# Patient Record
Sex: MALE | Race: BLACK OR AFRICAN AMERICAN | Employment: OTHER | URBAN - METROPOLITAN AREA
[De-identification: names, ages, dates, MRNs, and addresses within clinical notes are randomized per-mention and may not be internally consistent; named-entity substitution may affect disease eponyms.]

---

## 2017-02-08 ENCOUNTER — HOSPITAL ENCOUNTER (EMERGENCY)
Age: 47
Discharge: HOME | End: 2017-02-08
Attending: EMERGENCY MEDICINE
Payer: SELF-PAY

## 2017-02-08 ENCOUNTER — APPOINTMENT (OUTPATIENT)
Dept: GENERAL RADIOLOGY | Age: 47
End: 2017-02-08
Attending: EMERGENCY MEDICINE
Payer: SELF-PAY

## 2017-02-08 VITALS
SYSTOLIC BLOOD PRESSURE: 122 MMHG | TEMPERATURE: 97.9 F | RESPIRATION RATE: 16 BRPM | WEIGHT: 180 LBS | OXYGEN SATURATION: 99 % | BODY MASS INDEX: 28.93 KG/M2 | DIASTOLIC BLOOD PRESSURE: 88 MMHG | HEART RATE: 68 BPM | HEIGHT: 66 IN

## 2017-02-08 DIAGNOSIS — K29.00 OTHER ACUTE GASTRITIS WITHOUT HEMORRHAGE: Primary | ICD-10-CM

## 2017-02-08 LAB
ALBUMIN SERPL BCP-MCNC: 3.4 G/DL (ref 3.5–5)
ALBUMIN/GLOB SERPL: 0.8 {RATIO} (ref 1.2–3.5)
ALP SERPL-CCNC: 80 U/L (ref 50–136)
ALT SERPL-CCNC: 28 U/L (ref 12–65)
ANION GAP BLD CALC-SCNC: 10 MMOL/L (ref 7–16)
AST SERPL W P-5'-P-CCNC: 27 U/L (ref 15–37)
BACTERIA URNS QL MICRO: 0 /HPF
BASOPHILS # BLD AUTO: 0 K/UL (ref 0–0.2)
BASOPHILS # BLD: 0 % (ref 0–2)
BILIRUB SERPL-MCNC: 0.4 MG/DL (ref 0.2–1.1)
BUN SERPL-MCNC: 12 MG/DL (ref 6–23)
CALCIUM SERPL-MCNC: 8.5 MG/DL (ref 8.3–10.4)
CASTS URNS QL MICRO: 0 /LPF
CHLORIDE SERPL-SCNC: 103 MMOL/L (ref 98–107)
CO2 SERPL-SCNC: 26 MMOL/L (ref 21–32)
CREAT SERPL-MCNC: 0.96 MG/DL (ref 0.8–1.5)
DIFFERENTIAL METHOD BLD: ABNORMAL
EOSINOPHIL # BLD: 0.1 K/UL (ref 0–0.8)
EOSINOPHIL NFR BLD: 1 % (ref 0.5–7.8)
EPI CELLS #/AREA URNS HPF: 0 /HPF
ERYTHROCYTE [DISTWIDTH] IN BLOOD BY AUTOMATED COUNT: 13.2 % (ref 11.9–14.6)
GLOBULIN SER CALC-MCNC: 4.4 G/DL (ref 2.3–3.5)
GLUCOSE SERPL-MCNC: 90 MG/DL (ref 65–100)
HCT VFR BLD AUTO: 33.4 % (ref 41.1–50.3)
HGB BLD-MCNC: 11.2 G/DL (ref 13.6–17.2)
IMM GRANULOCYTES # BLD: 0 K/UL (ref 0–0.5)
IMM GRANULOCYTES NFR BLD AUTO: 0.1 % (ref 0–5)
LIPASE SERPL-CCNC: 121 U/L (ref 73–393)
LYMPHOCYTES # BLD AUTO: 32 % (ref 13–44)
LYMPHOCYTES # BLD: 2.3 K/UL (ref 0.5–4.6)
MCH RBC QN AUTO: 32.7 PG (ref 26.1–32.9)
MCHC RBC AUTO-ENTMCNC: 33.5 G/DL (ref 31.4–35)
MCV RBC AUTO: 97.7 FL (ref 79.6–97.8)
MONOCYTES # BLD: 0.7 K/UL (ref 0.1–1.3)
MONOCYTES NFR BLD AUTO: 9 % (ref 4–12)
NEUTS SEG # BLD: 4.1 K/UL (ref 1.7–8.2)
NEUTS SEG NFR BLD AUTO: 58 % (ref 43–78)
PLATELET # BLD AUTO: 328 K/UL (ref 150–450)
PMV BLD AUTO: 9.1 FL (ref 10.8–14.1)
POTASSIUM SERPL-SCNC: 3.6 MMOL/L (ref 3.5–5.1)
PROT SERPL-MCNC: 7.8 G/DL (ref 6.3–8.2)
RBC # BLD AUTO: 3.42 M/UL (ref 4.23–5.67)
RBC #/AREA URNS HPF: 0 /HPF
SODIUM SERPL-SCNC: 139 MMOL/L (ref 136–145)
WBC # BLD AUTO: 7.1 K/UL (ref 4.3–11.1)
WBC URNS QL MICRO: NORMAL /HPF

## 2017-02-08 PROCEDURE — 74022 RADEX COMPL AQT ABD SERIES: CPT

## 2017-02-08 PROCEDURE — 85025 COMPLETE CBC W/AUTO DIFF WBC: CPT | Performed by: EMERGENCY MEDICINE

## 2017-02-08 PROCEDURE — 74011250637 HC RX REV CODE- 250/637: Performed by: EMERGENCY MEDICINE

## 2017-02-08 PROCEDURE — 81003 URINALYSIS AUTO W/O SCOPE: CPT | Performed by: EMERGENCY MEDICINE

## 2017-02-08 PROCEDURE — 80053 COMPREHEN METABOLIC PANEL: CPT | Performed by: EMERGENCY MEDICINE

## 2017-02-08 PROCEDURE — 99284 EMERGENCY DEPT VISIT MOD MDM: CPT | Performed by: EMERGENCY MEDICINE

## 2017-02-08 PROCEDURE — 83690 ASSAY OF LIPASE: CPT | Performed by: EMERGENCY MEDICINE

## 2017-02-08 PROCEDURE — 81015 MICROSCOPIC EXAM OF URINE: CPT | Performed by: EMERGENCY MEDICINE

## 2017-02-08 PROCEDURE — 74011000250 HC RX REV CODE- 250: Performed by: EMERGENCY MEDICINE

## 2017-02-08 RX ORDER — LIDOCAINE HYDROCHLORIDE 20 MG/ML
15 SOLUTION OROPHARYNGEAL
Status: COMPLETED | OUTPATIENT
Start: 2017-02-08 | End: 2017-02-08

## 2017-02-08 RX ORDER — PANTOPRAZOLE SODIUM 40 MG/1
40 TABLET, DELAYED RELEASE ORAL DAILY
Qty: 20 TAB | Refills: 0 | Status: SHIPPED | OUTPATIENT
Start: 2017-02-08 | End: 2017-02-28

## 2017-02-08 RX ORDER — TRAMADOL HYDROCHLORIDE 50 MG/1
50 TABLET ORAL
Qty: 20 TAB | Refills: 0 | Status: SHIPPED | OUTPATIENT
Start: 2017-02-08 | End: 2017-05-11

## 2017-02-08 RX ADMIN — Medication 30 ML: at 21:27

## 2017-02-08 RX ADMIN — LIDOCAINE HYDROCHLORIDE 15 ML: 20 SOLUTION ORAL; TOPICAL at 21:27

## 2017-02-09 NOTE — ED PROVIDER NOTES
HPI Comments: Patient presents to the emergency room with a 2 week history of constant epigastric abdominal pain that is sharp in nature radiating to his back and he states he came in tonight because he could no longer \"tough it out\". He reports nausea and vomiting but no hematemesis he denies any melena or hematochezia does has a history of peptic ulcer disease for which she had a laparotomy at one time in the past.  He does admit to some alcohol use intermittently    Patient is a 55 y.o. male presenting with abdominal pain. The history is provided by the patient. Abdominal Pain    This is a recurrent problem. The current episode started more than 1 week ago. The problem occurs constantly. The problem has not changed since onset. The pain is associated with vomiting. The pain is located in the epigastric region. The quality of the pain is sharp. The pain is at a severity of 10/10. The pain is severe. Associated symptoms include nausea and vomiting. Pertinent negatives include no anorexia, no fever, no belching, no diarrhea, no flatus, no hematochezia, no melena, no constipation, no dysuria, no frequency, no hematuria, no arthralgias, no myalgias, no trauma, no chest pain, no testicular pain and no back pain. The pain is worsened by eating. The pain is relieved by nothing. Past workup includes surgery. Past workup includes no CT scan, no ultrasound. His past medical history is significant for PUD. gastrectomy       No past medical history on file. Past Surgical History:   Procedure Laterality Date    Hx orthopaedic Left      arm    Hx gi  10/3/14     gastric perforation         Family History:   Problem Relation Age of Onset    Cancer Mother      colon    Cancer Father      liver       Social History     Social History    Marital status: LIFE PARTNER     Spouse name: N/A    Number of children: N/A    Years of education: N/A     Occupational History    Not on file.      Social History Main Topics    Smoking status: Current Every Day Smoker     Packs/day: 0.50    Smokeless tobacco: Not on file    Alcohol use Yes    Drug use: Not on file    Sexual activity: Not on file     Other Topics Concern    Not on file     Social History Narrative    No narrative on file         ALLERGIES: Review of patient's allergies indicates no known allergies. Review of Systems   Constitutional: Negative for fever. Cardiovascular: Negative for chest pain. Gastrointestinal: Positive for abdominal pain, nausea and vomiting. Negative for anorexia, constipation, diarrhea, flatus, hematochezia and melena. Genitourinary: Negative for dysuria, frequency, hematuria and testicular pain. Musculoskeletal: Negative for arthralgias, back pain and myalgias. All other systems reviewed and are negative. Vitals:    02/08/17 1918   BP: (!) 129/94   Pulse: 89   Resp: 18   Temp: 97.9 °F (36.6 °C)   SpO2: 97%   Weight: 81.6 kg (180 lb)   Height: 5' 6\" (1.676 m)            Physical Exam   Constitutional: He is oriented to person, place, and time. He appears well-developed and well-nourished. No distress. HENT:   Head: Normocephalic and atraumatic. Mouth/Throat: No oropharyngeal exudate. Eyes: Conjunctivae and EOM are normal. Pupils are equal, round, and reactive to light. Neck: Normal range of motion. Neck supple. Cardiovascular: Normal rate, regular rhythm and normal heart sounds. Pulmonary/Chest: Effort normal and breath sounds normal.   Abdominal: Soft. Bowel sounds are normal. He exhibits no distension and no mass. There is tenderness. There is no rebound and no guarding. Musculoskeletal: Normal range of motion. Neurological: He is alert and oriented to person, place, and time. Skin: Skin is warm and dry. Psychiatric: He has a normal mood and affect. His behavior is normal.   Nursing note and vitals reviewed.        MDM  Number of Diagnoses or Management Options  Diagnosis management comments: Differential diagnosis includes gastritis or gastric ulcers and duodenal ulcer or pancreatitis       Amount and/or Complexity of Data Reviewed  Clinical lab tests: ordered and reviewed  Tests in the radiology section of CPT®: ordered and reviewed  Independent visualization of images, tracings, or specimens: yes    Risk of Complications, Morbidity, and/or Mortality  Presenting problems: high  Diagnostic procedures: high  Management options: moderate    Patient Progress  Patient progress: stable    ED Course       Procedures

## 2017-02-09 NOTE — ED NOTES
Patient discharged home ambulatory with partner. Patient given medication, discharge, and follow up instructions. Patient verbalized understanding.   Patient had no questions and reported feeling better

## 2017-02-09 NOTE — ED TRIAGE NOTES
PT arrived to ED c/o abdominal pain for the past 3 weeks. PT has a HX of abdominal surgery 2 years ago. PT states he has been nauseous, and constipated. PT states laying down makes his pain worse. Pt describes pain as throbbing. PT rates pain a 10 out of 10.

## 2017-02-09 NOTE — MED STUDENT NOTES
History and Physical    Subjective:     Katie Linton is a 55 y.o.  male who presents with diffuse abdominal pain for 2 weeks. Pt has a PMHx of perforated ulcer 2 yrs ago. He rates the pain 10/10. Pt reports nausea, vomiting and decreased appetite, stating that he is unable to keep anything down. He reports constipation with his last BM today where he described it as \"greenish, brown drops\". He denied blood in his stool or dysuria. Pt smokes 1/2 ppd and drinks 4 beers/day. He denies fever, or chills. No past medical history on file. Past Surgical History   Procedure Laterality Date    Hx orthopaedic Left      arm    Hx gi  10/3/14     gastric perforation     Family History   Problem Relation Age of Onset    Cancer Mother      colon    Cancer Father      liver      Social History   Substance Use Topics    Smoking status: Current Every Day Smoker     Packs/day: 0.50    Smokeless tobacco: Not on file    Alcohol use Yes       Prior to Admission medications    Medication Sig Start Date End Date Taking? Authorizing Provider   oxycodone-acetaminophen (PERCOCET) 5-325 mg per tablet Take 1-2 tablets by mouth every four (4) hours as needed for Pain. 10/7/14   Zana STACI Horton   ranitidine (ZANTAC) 150 mg tablet Take 1 tablet by mouth two (2) times a day.  10/7/14   Zana STACI Horton     No Known Allergies       Objective:           Physical Exam:   General: 53yo  male in no acute distress  HEENT: no lymphadenopathy, poor dentition   Cardio: regular rate and rhythm, no murmurs, rubs or gallops  Pulm: CTA bilaterally, no wheezes, rales or rhonchi  Abdomen: Hyperactive bowel sounds, tender to palpation epigastric, R & LUQ, no voluntary guarding  CVA tenderness noted b/l  Neuro: alert, awake and oriented        Data Review:   Recent Results (from the past 24 hour(s))   CBC WITH AUTOMATED DIFF    Collection Time: 02/08/17  7:15 PM   Result Value Ref Range    WBC 7.1 4.3 - 11.1 K/uL    RBC 3.42 (L) 4.23 - 5.67 M/uL    HGB 11.2 (L) 13.6 - 17.2 g/dL    HCT 33.4 (L) 41.1 - 50.3 %    MCV 97.7 79.6 - 97.8 FL    MCH 32.7 26.1 - 32.9 PG    MCHC 33.5 31.4 - 35.0 g/dL    RDW 13.2 11.9 - 14.6 %    PLATELET 032 638 - 858 K/uL    MPV 9.1 (L) 10.8 - 14.1 FL    DF AUTOMATED      NEUTROPHILS 58 43 - 78 %    LYMPHOCYTES 32 13 - 44 %    MONOCYTES 9 4.0 - 12.0 %    EOSINOPHILS 1 0.5 - 7.8 %    BASOPHILS 0 0.0 - 2.0 %    IMMATURE GRANULOCYTES 0.1 0.0 - 5.0 %    ABS. NEUTROPHILS 4.1 1.7 - 8.2 K/UL    ABS. LYMPHOCYTES 2.3 0.5 - 4.6 K/UL    ABS. MONOCYTES 0.7 0.1 - 1.3 K/UL    ABS. EOSINOPHILS 0.1 0.0 - 0.8 K/UL    ABS. BASOPHILS 0.0 0.0 - 0.2 K/UL    ABS. IMM. GRANS. 0.0 0.0 - 0.5 K/UL   METABOLIC PANEL, COMPREHENSIVE    Collection Time: 02/08/17  7:15 PM   Result Value Ref Range    Sodium 139 136 - 145 mmol/L    Potassium 3.6 3.5 - 5.1 mmol/L    Chloride 103 98 - 107 mmol/L    CO2 26 21 - 32 mmol/L    Anion gap 10 7 - 16 mmol/L    Glucose 90 65 - 100 mg/dL    BUN 12 6 - 23 MG/DL    Creatinine 0.96 0.8 - 1.5 MG/DL    GFR est AA >60 >60 ml/min/1.73m2    GFR est non-AA >60 >60 ml/min/1.73m2    Calcium 8.5 8.3 - 10.4 MG/DL    Bilirubin, total 0.4 0.2 - 1.1 MG/DL    ALT (SGPT) 28 12 - 65 U/L    AST (SGOT) 27 15 - 37 U/L    Alk.  phosphatase 80 50 - 136 U/L    Protein, total 7.8 6.3 - 8.2 g/dL    Albumin 3.4 (L) 3.5 - 5.0 g/dL    Globulin 4.4 (H) 2.3 - 3.5 g/dL    A-G Ratio 0.8 (L) 1.2 - 3.5     LIPASE    Collection Time: 02/08/17  7:15 PM   Result Value Ref Range    Lipase 121 73 - 393 U/L   URINE MICROSCOPIC    Collection Time: 02/08/17  8:34 PM   Result Value Ref Range    WBC 0-3 0 /hpf    RBC 0 0 /hpf    Epithelial cells 0 0 /hpf    Bacteria 0 0 /hpf    Casts 0 0 /lpf           Assessment:     R/o gastric perforation  Gastritis   PUD  R/o pancreatitis-Lipase WNL    Plan:     Gastritis-abdominal series XR to r/o gastric perforation was negative  Advised pt to avoid alcohol and medications like ASA and motrin   See if pain improves with GI cocktail     Signed By: Steven Moreland     February 8, 2017       *ATTENTION:  This note has been created by a medical student for educational purposes only. Please do not refer to the content of this note for clinical decision-making, billing, or other purposes. Please see attending physicians note to obtain clinical information on this patient. *

## 2017-02-09 NOTE — ED NOTES
\"He had a ulcer bust a couple years ago and they said if we had any problems to come back.   He has been hurting for 2 weeks\"

## 2017-02-09 NOTE — DISCHARGE INSTRUCTIONS
Gastritis: Care Instructions  Your Care Instructions    Gastritis is a sore and upset stomach. It happens when something irritates the stomach lining. Many things can cause it. These include an infection such as the flu or something you ate or drank. Medicines or a sore on the lining of the stomach (ulcer) also can cause it. Your belly may bloat and ache. You may belch, vomit, and feel sick to your stomach. You should be able to relieve the problem by taking medicine. And it may help to change your diet. If gastritis lasts, your doctor may prescribe medicine. Follow-up care is a key part of your treatment and safety. Be sure to make and go to all appointments, and call your doctor if you are having problems. It's also a good idea to know your test results and keep a list of the medicines you take. How can you care for yourself at home? · If your doctor prescribed antibiotics, take them as directed. Do not stop taking them just because you feel better. You need to take the full course of antibiotics. · Be safe with medicines. If your doctor prescribed medicine to decrease stomach acid, take it as directed. Call your doctor if you think you are having a problem with your medicine. · Do not take any other medicine, including over-the-counter pain relievers, without talking to your doctor first.  · If your doctor recommends over-the-counter medicine to reduce stomach acid, such as Pepcid AC, Prilosec, Tagamet HB, or Zantac 75, follow the directions on the label. · Drink plenty of fluids (enough so that your urine is light yellow or clear like water) to prevent dehydration. Choose water and other caffeine-free clear liquids. If you have kidney, heart, or liver disease and have to limit fluids, talk with your doctor before you increase the amount of fluids you drink. · Limit how much alcohol you drink. · Avoid coffee, tea, cola drinks, chocolate, and other foods with caffeine.  They increase stomach acid.  When should you call for help? Call 911 anytime you think you may need emergency care. For example, call if:  · You vomit blood or what looks like coffee grounds. · You pass maroon or very bloody stools. Call your doctor now or seek immediate medical care if:  · You start breathing fast and have not produced urine in the last 8 hours. · You cannot keep fluids down. Watch closely for changes in your health, and be sure to contact your doctor if:  · You do not get better as expected. Where can you learn more? Go to http://jenni-davidson.info/. Enter [id number] in the search box to learn more about \"Gastritis: Care Instructions. \"  Current as of: August 9, 2016  Content Version: 11.1  © 8375-4605 [employer]. Care instructions adapted under license by [employer] (which disclaims liability or warranty for this information). If you have questions about a medical condition or this instruction, always ask your healthcare professional. [address] any warranty or liability for your use of this information.

## 2017-05-11 ENCOUNTER — HOSPITAL ENCOUNTER (EMERGENCY)
Age: 47
Discharge: HOME | End: 2017-05-11
Payer: SELF-PAY

## 2017-05-11 ENCOUNTER — APPOINTMENT (OUTPATIENT)
Dept: GENERAL RADIOLOGY | Age: 47
End: 2017-05-11
Payer: SELF-PAY

## 2017-05-11 VITALS
OXYGEN SATURATION: 97 % | HEART RATE: 80 BPM | WEIGHT: 170 LBS | RESPIRATION RATE: 18 BRPM | TEMPERATURE: 98.3 F | SYSTOLIC BLOOD PRESSURE: 120 MMHG | DIASTOLIC BLOOD PRESSURE: 78 MMHG | BODY MASS INDEX: 25.18 KG/M2 | HEIGHT: 69 IN
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
## 2017-05-11 DIAGNOSIS — S39.012A LUMBAR STRAIN, INITIAL ENCOUNTER: ICD-10-CM

## 2017-05-11 DIAGNOSIS — S16.1XXA CERVICAL STRAIN, INITIAL ENCOUNTER: Primary | ICD-10-CM

## 2017-05-11 DIAGNOSIS — M47.816 OSTEOARTHRITIS OF LUMBAR SPINE, UNSPECIFIED SPINAL OSTEOARTHRITIS COMPLICATION STATUS: ICD-10-CM

## 2017-05-11 PROCEDURE — 72040 X-RAY EXAM NECK SPINE 2-3 VW: CPT

## 2017-05-11 PROCEDURE — 99285 EMERGENCY DEPT VISIT HI MDM: CPT

## 2017-05-11 PROCEDURE — 74011250637 HC RX REV CODE- 250/637

## 2017-05-11 PROCEDURE — 72100 X-RAY EXAM L-S SPINE 2/3 VWS: CPT

## 2017-05-11 RX ORDER — CYCLOBENZAPRINE HCL 10 MG
10 TABLET ORAL
Qty: 15 TAB | Refills: 0 | Status: SHIPPED | OUTPATIENT
Start: 2017-05-11 | End: 2017-05-11

## 2017-05-11 RX ORDER — IBUPROFEN 600 MG/1
600 TABLET ORAL
Qty: 20 TAB | Refills: 0 | Status: SHIPPED | OUTPATIENT
Start: 2017-05-11 | End: 2019-03-28

## 2017-05-11 RX ORDER — IBUPROFEN 600 MG/1
600 TABLET ORAL
Qty: 20 TAB | Refills: 0 | Status: SHIPPED | OUTPATIENT
Start: 2017-05-11 | End: 2017-05-11

## 2017-05-11 RX ORDER — IBUPROFEN 400 MG/1
400 TABLET ORAL
Status: COMPLETED | OUTPATIENT
Start: 2017-05-11 | End: 2017-05-11

## 2017-05-11 RX ORDER — CYCLOBENZAPRINE HCL 10 MG
10 TABLET ORAL
Qty: 15 TAB | Refills: 0 | Status: SHIPPED | OUTPATIENT
Start: 2017-05-11

## 2017-05-11 RX ORDER — CYCLOBENZAPRINE HCL 10 MG
10 TABLET ORAL
Status: COMPLETED | OUTPATIENT
Start: 2017-05-11 | End: 2017-05-11

## 2017-05-11 RX ADMIN — IBUPROFEN 400 MG: 400 TABLET, FILM COATED ORAL at 09:23

## 2017-05-11 RX ADMIN — CYCLOBENZAPRINE HYDROCHLORIDE 10 MG: 10 TABLET, FILM COATED ORAL at 09:23
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
## 2017-05-11 NOTE — LETTER
[address] [facility] EMERGENCY DEPT 
[facility] [address] [facility] [address] 
[phone] Work/School Note Date: 5/11/2017 To Whom It May concern: 
 
Karissa Dumont was seen and treated today in the emergency room by the following provider(s): 
Attending Provider: Alma Up MD.   
 
Karissa Dumont may return to work on 5/13/17. Sincerely, Marguerite Moscoso RN

## 2018-03-15 ENCOUNTER — HOSPITAL ENCOUNTER (EMERGENCY)
Age: 48
Discharge: HOME | End: 2018-03-15
Attending: EMERGENCY MEDICINE
Payer: SELF-PAY

## 2018-03-15 VITALS
SYSTOLIC BLOOD PRESSURE: 112 MMHG | RESPIRATION RATE: 16 BRPM | BODY MASS INDEX: 23.7 KG/M2 | TEMPERATURE: 98.7 F | HEIGHT: 69 IN | WEIGHT: 160 LBS | HEART RATE: 78 BPM | OXYGEN SATURATION: 99 % | DIASTOLIC BLOOD PRESSURE: 72 MMHG

## 2018-03-15 DIAGNOSIS — S41.112A LACERATION OF LEFT UPPER EXTREMITY, INITIAL ENCOUNTER: Primary | ICD-10-CM

## 2018-03-15 PROCEDURE — 77030002916 HC SUT ETHLN J&J -A

## 2018-03-15 PROCEDURE — 99283 EMERGENCY DEPT VISIT LOW MDM: CPT | Performed by: EMERGENCY MEDICINE

## 2018-03-15 PROCEDURE — 75810000293 HC SIMP/SUPERF WND  RPR: Performed by: EMERGENCY MEDICINE

## 2018-03-15 PROCEDURE — 74011250637 HC RX REV CODE- 250/637: Performed by: EMERGENCY MEDICINE

## 2018-03-15 RX ORDER — IBUPROFEN 800 MG/1
800 TABLET ORAL ONCE
Status: COMPLETED | OUTPATIENT
Start: 2018-03-15 | End: 2018-03-15

## 2018-03-15 RX ORDER — CEPHALEXIN 500 MG/1
500 CAPSULE ORAL 4 TIMES DAILY
Qty: 28 CAP | Refills: 0 | Status: SHIPPED | OUTPATIENT
Start: 2018-03-15 | End: 2018-03-22

## 2018-03-15 RX ORDER — CEPHALEXIN 500 MG/1
500 CAPSULE ORAL
Status: COMPLETED | OUTPATIENT
Start: 2018-03-15 | End: 2018-03-15

## 2018-03-15 RX ADMIN — CEPHALEXIN 500 MG: 500 CAPSULE ORAL at 21:59

## 2018-03-15 RX ADMIN — IBUPROFEN 800 MG: 800 TABLET, FILM COATED ORAL at 21:59

## 2018-03-15 NOTE — ED TRIAGE NOTES
Pt states having a laceration to the left arm that has been bleeding since yesterday. Pt has a bandage on the site and no active bleeding is noted.  Pt cut it on a piece of metal.

## 2018-03-16 NOTE — DISCHARGE INSTRUCTIONS
Cuts: Care Instructions  Your Care Instructions  A cut can happen anywhere on your body. Stitches, staples, skin adhesives, or pieces of tape called Steri-Strips are sometimes used to keep the edges of a cut together and help it heal. Steri-Strips can be used by themselves or with stitches or staples. Sometimes cuts are left open. If the cut went deep and through the skin, the doctor may have closed the cut in two layers. A deeper layer of stitches brings the deep part of the cut together. These stitches will dissolve and don't need to be removed. The upper layer closure, which could be stitches, staples, Steri-Strips, or adhesive, is what you see on the cut. A cut is often covered by a bandage. The doctor has checked you carefully, but problems can develop later. If you notice any problems or new symptoms, get medical treatment right away. Follow-up care is a key part of your treatment and safety. Be sure to make and go to all appointments, and call your doctor if you are having problems. It's also a good idea to know your test results and keep a list of the medicines you take. How can you care for yourself at home? If a cut is open or closed  · Prop up the sore area on a pillow anytime you sit or lie down during the next 3 days. Try to keep it above the level of your heart. This will help reduce swelling. · Keep the cut dry for the first 24 to 48 hours. After this, you can shower if your doctor okays it. Pat the cut dry. · Don't soak the cut, such as in a bathtub. Your doctor will tell you when it's safe to get the cut wet. · After the first 24 to 48 hours, clean the cut with soap and water 2 times a day unless your doctor gives you different instructions. ¨ Don't use hydrogen peroxide or alcohol, which can slow healing. ¨ You may cover the cut with a thin layer of petroleum jelly and a nonstick bandage.   ¨ If the doctor put a bandage over the cut, put on a new bandage after cleaning the cut or if the bandage gets wet or dirty. · Avoid any activity that could cause your cut to reopen. · Be safe with medicines. Read and follow all instructions on the label. ¨ If the doctor gave you a prescription medicine for pain, take it as prescribed. ¨ If you are not taking a prescription pain medicine, ask your doctor if you can take an over-the-counter medicine. If the cut is closed with stitches, staples, or Steri-Strips  · Follow the above instructions for open or closed cuts. · Do not remove the stitches or staples on your own. Your doctor will tell you when to come back to have the stitches or staples removed. · Leave Steri-Strips on until they fall off. If the cut is closed with a skin adhesive  · Follow the above instructions for open or closed cuts. · Leave the skin adhesive on your skin until it falls off on its own. This may take 5 to 10 days. · Do not scratch, rub, or pick at the adhesive. · Do not put the sticky part of a bandage directly on the adhesive. · Do not put any kind of ointment, cream, or lotion over the area. This can make the adhesive fall off too soon. Do not use hydrogen peroxide or alcohol, which can slow healing. When should you call for help? Call your doctor now or seek immediate medical care if:  ? · You have new pain, or your pain gets worse. ? · The skin near the cut is cold or pale or changes color. ? · You have tingling, weakness, or numbness near the cut.   ? · The cut starts to bleed, and blood soaks through the bandage. Oozing small amounts of blood is normal.   ? · You have trouble moving the area near the cut.   ? · You have symptoms of infection, such as:  ¨ Increased pain, swelling, warmth, or redness around the cut. ¨ Red streaks leading from the cut. ¨ Pus draining from the cut. ¨ A fever. ? Watch closely for changes in your health, and be sure to contact your doctor if:  ? · The cut reopens. ? · You do not get better as expected.    Where can you learn more? Go to http://jenni-davidson.info/. Enter M735 in the search box to learn more about \"Cuts: Care Instructions. \"  Current as of: March 20, 2017  Content Version: 11.4  © 4942-6134 [employer]. Care instructions adapted under license by [employer] (which disclaims liability or warranty for this information). If you have questions about a medical condition or this instruction, always ask your healthcare professional. [address] any warranty or liability for your use of this information.

## 2018-03-16 NOTE — ED NOTES
I have reviewed discharge instructions with the patient. The patient verbalized understanding. Patient left ED via Discharge Method: ambulatory to Home with  self    Opportunity for questions and clarification provided. Patient given 1 scripts. To continue your aftercare when you leave the hospital, you may receive an automated call from our care team to check in on how you are doing. This is a free service and part of our promise to provide the best care and service to meet your aftercare needs.  If you have questions, or wish to unsubscribe from this service please call [phone]. Thank you for Choosing our [facility].

## 2018-03-16 NOTE — ED PROVIDER NOTES
HPI Comments: Patient is a 59-year-old male who states he cut his left forearm on a piece of metal yesterday. He dressed it with a bandage himself. He he did not seek medical care. He states he had a tetanus shot within the past 5 years. Patient is a 52 y.o. male presenting with skin laceration. The history is provided by the patient. Laceration    The incident occurred yesterday. The laceration is located on the left arm. The laceration is 5 cm in size. The injury mechanism is other. Foreign body present: no. The pain is mild. The pain has been constant since onset. Pertinent negatives include no numbness, no tingling, no loss of motion and no discoloration. The patient's last tetanus shot was less than 5 years ago. Past Medical History:   Diagnosis Date    Chronic kidney disease        Past Surgical History:   Procedure Laterality Date    HX GI  10/3/14    gastric perforation    HX ORTHOPAEDIC Left     arm         Family History:   Problem Relation Age of Onset    Cancer Mother      colon    Cancer Father      liver       Social History     Social History    Marital status: LIFE PARTNER     Spouse name: N/A    Number of children: N/A    Years of education: N/A     Occupational History    Not on file. Social History Main Topics    Smoking status: Current Every Day Smoker     Packs/day: 0.50    Smokeless tobacco: Not on file    Alcohol use Yes    Drug use: Not on file    Sexual activity: Not on file     Other Topics Concern    Not on file     Social History Narrative         ALLERGIES: Review of patient's allergies indicates no known allergies. Review of Systems   Constitutional: Negative. HENT: Negative. Eyes: Negative. Respiratory: Negative. Cardiovascular: Negative. Endocrine: Negative. Genitourinary: Negative. Neurological: Negative for tingling and numbness.        Vitals:    03/15/18 1853   BP: 110/77   Pulse: 96   Resp: 16   Temp: 98.7 °F (37.1 °C) SpO2: 96%   Weight: 72.6 kg (160 lb)   Height: 5' 9\" (1.753 m)            Physical Exam   Constitutional: He is oriented to person, place, and time. He appears well-developed and well-nourished. Cardiovascular: Intact distal pulses. Musculoskeletal: He exhibits no tenderness or deformity. Gaping open 5 cm laceration on the left proximal forearm there is no active bleeding, no obvious foreign body. Neurovascular status distal to the wound is all completely intact. Neurological: He is alert and oriented to person, place, and time. He has normal strength. No sensory deficit. Skin: Skin is warm and dry. No erythema. Vitals reviewed. MDM  Number of Diagnoses or Management Options  Diagnosis management comments: Differential diagnosis includes laceration, tendon injury, muscle injury, foreign body    This wound is over 25 hours old however it is gaping open. Feel it would best be treated with loose closure at this time we will also place him on empiric oral antibiotics because it appeared to be a dirty wound. Risk of Complications, Morbidity, and/or Mortality  Presenting problems: low  Diagnostic procedures: minimal  Management options: moderate    Patient Progress  Patient progress: stable        ED Course       Wound Repair  Date/Time: 3/15/2018 9:30 PM  Performed by: studentSupervising provider: Dr Abbey Bender  Preparation: skin prepped with Betadine  Pre-procedure re-eval: Immediately prior to the procedure, the patient was reevaluated and found suitable for the planned procedure and any planned medications. Time out: Immediately prior to the procedure a time out was called to verify the correct patient, procedure, equipment, staff and marking as appropriate. .  Location details: left arm  Wound length:2.6 - 7.5 cm  Anesthesia: local infiltration    Anesthesia:  Local Anesthetic: lidocaine 1% without epinephrine  Anesthetic total: 5 mL  Foreign bodies: no foreign bodies  Irrigation solution: saline  Irrigation method: syringe  Debridement: none  Skin closure: 4-0 nylon  Number of sutures: 4  Technique: simple  Approximation: loose  Dressing: non-adhesive packing strip  Patient tolerance: Patient tolerated the procedure well with no immediate complications  My total time at bedside, performing this procedure was 16-30 minutes.

## 2018-07-15 ENCOUNTER — HOSPITAL ENCOUNTER (INPATIENT)
Age: 48
LOS: 9 days | Discharge: HOME | DRG: 271 | End: 2018-07-24
Attending: EMERGENCY MEDICINE | Admitting: INTERNAL MEDICINE
Payer: SELF-PAY

## 2018-07-15 ENCOUNTER — APPOINTMENT (OUTPATIENT)
Dept: CT IMAGING | Age: 48
DRG: 271 | End: 2018-07-15
Attending: EMERGENCY MEDICINE
Payer: SELF-PAY

## 2018-07-15 DIAGNOSIS — R10.9 ABDOMINAL PAIN, UNSPECIFIED ABDOMINAL LOCATION: ICD-10-CM

## 2018-07-15 DIAGNOSIS — R63.4 EXCESSIVE WEIGHT LOSS: ICD-10-CM

## 2018-07-15 DIAGNOSIS — I82.220: Primary | ICD-10-CM

## 2018-07-15 DIAGNOSIS — I82.220 IVC THROMBOSIS (HCC): ICD-10-CM

## 2018-07-15 DIAGNOSIS — D18.03 HEMANGIOMA OF LIVER: ICD-10-CM

## 2018-07-15 LAB
ALBUMIN SERPL-MCNC: 3.7 G/DL (ref 3.5–5)
ALBUMIN/GLOB SERPL: 0.9 {RATIO} (ref 1.2–3.5)
ALP SERPL-CCNC: 70 U/L (ref 50–136)
ALT SERPL-CCNC: 40 U/L (ref 12–65)
ANION GAP SERPL CALC-SCNC: 10 MMOL/L (ref 7–16)
AST SERPL-CCNC: 54 U/L (ref 15–37)
BACTERIA URNS QL MICRO: ABNORMAL /HPF
BASOPHILS # BLD: 0 K/UL (ref 0–0.2)
BASOPHILS NFR BLD: 0 % (ref 0–2)
BILIRUB SERPL-MCNC: 1.2 MG/DL (ref 0.2–1.1)
BUN SERPL-MCNC: 15 MG/DL (ref 6–23)
CALCIUM SERPL-MCNC: 8.6 MG/DL (ref 8.3–10.4)
CASTS URNS QL MICRO: ABNORMAL /LPF
CHLORIDE SERPL-SCNC: 105 MMOL/L (ref 98–107)
CO2 SERPL-SCNC: 24 MMOL/L (ref 21–32)
CREAT SERPL-MCNC: 1.16 MG/DL (ref 0.8–1.5)
DIFFERENTIAL METHOD BLD: ABNORMAL
EOSINOPHIL # BLD: 0 K/UL (ref 0–0.8)
EOSINOPHIL NFR BLD: 1 % (ref 0.5–7.8)
EPI CELLS #/AREA URNS HPF: ABNORMAL /HPF
ERYTHROCYTE [DISTWIDTH] IN BLOOD BY AUTOMATED COUNT: 13.2 % (ref 11.9–14.6)
GLOBULIN SER CALC-MCNC: 4.3 G/DL (ref 2.3–3.5)
GLUCOSE SERPL-MCNC: 109 MG/DL (ref 65–100)
HCT VFR BLD AUTO: 43.5 % (ref 41.1–50.3)
HGB BLD-MCNC: 14.7 G/DL (ref 13.6–17.2)
IMM GRANULOCYTES # BLD: 0 K/UL (ref 0–0.5)
IMM GRANULOCYTES NFR BLD AUTO: 0 % (ref 0–5)
LIPASE SERPL-CCNC: 34 U/L (ref 73–393)
LYMPHOCYTES # BLD: 1.7 K/UL (ref 0.5–4.6)
LYMPHOCYTES NFR BLD: 40 % (ref 13–44)
MCH RBC QN AUTO: 33.2 PG (ref 26.1–32.9)
MCHC RBC AUTO-ENTMCNC: 33.8 G/DL (ref 31.4–35)
MCV RBC AUTO: 98.2 FL (ref 79.6–97.8)
MONOCYTES # BLD: 0.5 K/UL (ref 0.1–1.3)
MONOCYTES NFR BLD: 13 % (ref 4–12)
NEUTS SEG # BLD: 2 K/UL (ref 1.7–8.2)
NEUTS SEG NFR BLD: 46 % (ref 43–78)
PLATELET # BLD AUTO: 282 K/UL (ref 150–450)
PMV BLD AUTO: 9.5 FL (ref 10.8–14.1)
POTASSIUM SERPL-SCNC: 5.1 MMOL/L (ref 3.5–5.1)
PROT SERPL-MCNC: 8 G/DL (ref 6.3–8.2)
RBC # BLD AUTO: 4.43 M/UL (ref 4.23–5.67)
RBC #/AREA URNS HPF: ABNORMAL /HPF
SODIUM SERPL-SCNC: 139 MMOL/L (ref 136–145)
WBC # BLD AUTO: 4.3 K/UL (ref 4.3–11.1)
WBC URNS QL MICRO: ABNORMAL /HPF

## 2018-07-15 PROCEDURE — 74011000258 HC RX REV CODE- 258: Performed by: EMERGENCY MEDICINE

## 2018-07-15 PROCEDURE — 65660000000 HC RM CCU STEPDOWN

## 2018-07-15 PROCEDURE — 96375 TX/PRO/DX INJ NEW DRUG ADDON: CPT | Performed by: EMERGENCY MEDICINE

## 2018-07-15 PROCEDURE — 74011636320 HC RX REV CODE- 636/320: Performed by: EMERGENCY MEDICINE

## 2018-07-15 PROCEDURE — 74177 CT ABD & PELVIS W/CONTRAST: CPT

## 2018-07-15 PROCEDURE — 74011250636 HC RX REV CODE- 250/636: Performed by: EMERGENCY MEDICINE

## 2018-07-15 PROCEDURE — 80053 COMPREHEN METABOLIC PANEL: CPT | Performed by: EMERGENCY MEDICINE

## 2018-07-15 PROCEDURE — 96376 TX/PRO/DX INJ SAME DRUG ADON: CPT | Performed by: EMERGENCY MEDICINE

## 2018-07-15 PROCEDURE — 85730 THROMBOPLASTIN TIME PARTIAL: CPT | Performed by: NURSE PRACTITIONER

## 2018-07-15 PROCEDURE — 96374 THER/PROPH/DIAG INJ IV PUSH: CPT | Performed by: EMERGENCY MEDICINE

## 2018-07-15 PROCEDURE — 83690 ASSAY OF LIPASE: CPT | Performed by: EMERGENCY MEDICINE

## 2018-07-15 PROCEDURE — 36415 COLL VENOUS BLD VENIPUNCTURE: CPT | Performed by: NURSE PRACTITIONER

## 2018-07-15 PROCEDURE — 85025 COMPLETE CBC W/AUTO DIFF WBC: CPT | Performed by: EMERGENCY MEDICINE

## 2018-07-15 PROCEDURE — 81015 MICROSCOPIC EXAM OF URINE: CPT | Performed by: EMERGENCY MEDICINE

## 2018-07-15 PROCEDURE — 81003 URINALYSIS AUTO W/O SCOPE: CPT | Performed by: EMERGENCY MEDICINE

## 2018-07-15 PROCEDURE — 99285 EMERGENCY DEPT VISIT HI MDM: CPT | Performed by: EMERGENCY MEDICINE

## 2018-07-15 RX ORDER — NALOXONE HYDROCHLORIDE 0.4 MG/ML
0.4 INJECTION, SOLUTION INTRAMUSCULAR; INTRAVENOUS; SUBCUTANEOUS AS NEEDED
Status: DISCONTINUED | OUTPATIENT
Start: 2018-07-15 | End: 2018-07-24 | Stop reason: HOSPADM

## 2018-07-15 RX ORDER — SODIUM CHLORIDE 9 MG/ML
100 INJECTION, SOLUTION INTRAVENOUS CONTINUOUS
Status: DISCONTINUED | OUTPATIENT
Start: 2018-07-15 | End: 2018-07-16

## 2018-07-15 RX ORDER — HEPARIN SODIUM 5000 [USP'U]/ML
5000 INJECTION, SOLUTION INTRAVENOUS; SUBCUTANEOUS
Status: COMPLETED | OUTPATIENT
Start: 2018-07-15 | End: 2018-07-15

## 2018-07-15 RX ORDER — ONDANSETRON 2 MG/ML
4 INJECTION INTRAMUSCULAR; INTRAVENOUS
Status: COMPLETED | OUTPATIENT
Start: 2018-07-15 | End: 2018-07-15

## 2018-07-15 RX ORDER — HEPARIN SODIUM 5000 [USP'U]/100ML
18-36 INJECTION, SOLUTION INTRAVENOUS
Status: DISCONTINUED | OUTPATIENT
Start: 2018-07-15 | End: 2018-07-16 | Stop reason: DRUGHIGH

## 2018-07-15 RX ORDER — LORAZEPAM 1 MG/1
1 TABLET ORAL
Status: DISCONTINUED | OUTPATIENT
Start: 2018-07-15 | End: 2018-07-24 | Stop reason: HOSPADM

## 2018-07-15 RX ORDER — HYDRALAZINE HYDROCHLORIDE 20 MG/ML
10 INJECTION INTRAMUSCULAR; INTRAVENOUS EVERY 6 HOURS
Status: DISCONTINUED | OUTPATIENT
Start: 2018-07-15 | End: 2018-07-15

## 2018-07-15 RX ORDER — ONDANSETRON 2 MG/ML
4 INJECTION INTRAMUSCULAR; INTRAVENOUS
Status: DISCONTINUED | OUTPATIENT
Start: 2018-07-15 | End: 2018-07-24 | Stop reason: HOSPADM

## 2018-07-15 RX ORDER — MORPHINE SULFATE 2 MG/ML
6 INJECTION, SOLUTION INTRAMUSCULAR; INTRAVENOUS
Status: COMPLETED | OUTPATIENT
Start: 2018-07-15 | End: 2018-07-15

## 2018-07-15 RX ORDER — MORPHINE SULFATE 2 MG/ML
2 INJECTION, SOLUTION INTRAMUSCULAR; INTRAVENOUS
Status: DISCONTINUED | OUTPATIENT
Start: 2018-07-15 | End: 2018-07-20

## 2018-07-15 RX ORDER — MORPHINE SULFATE 2 MG/ML
4 INJECTION, SOLUTION INTRAMUSCULAR; INTRAVENOUS
Status: COMPLETED | OUTPATIENT
Start: 2018-07-15 | End: 2018-07-15

## 2018-07-15 RX ORDER — SODIUM CHLORIDE 0.9 % (FLUSH) 0.9 %
5-10 SYRINGE (ML) INJECTION EVERY 8 HOURS
Status: DISCONTINUED | OUTPATIENT
Start: 2018-07-15 | End: 2018-07-24 | Stop reason: HOSPADM

## 2018-07-15 RX ORDER — SODIUM CHLORIDE 0.9 % (FLUSH) 0.9 %
5-10 SYRINGE (ML) INJECTION AS NEEDED
Status: DISCONTINUED | OUTPATIENT
Start: 2018-07-15 | End: 2018-07-24 | Stop reason: HOSPADM

## 2018-07-15 RX ORDER — ENALAPRILAT 1.25 MG/ML
1.25 INJECTION INTRAVENOUS EVERY 6 HOURS
Status: DISCONTINUED | OUTPATIENT
Start: 2018-07-15 | End: 2018-07-17

## 2018-07-15 RX ORDER — SODIUM CHLORIDE 0.9 % (FLUSH) 0.9 %
10 SYRINGE (ML) INJECTION
Status: COMPLETED | OUTPATIENT
Start: 2018-07-15 | End: 2018-07-15

## 2018-07-15 RX ORDER — IBUPROFEN 200 MG
1 TABLET ORAL DAILY
Status: DISCONTINUED | OUTPATIENT
Start: 2018-07-16 | End: 2018-07-24 | Stop reason: HOSPADM

## 2018-07-15 RX ADMIN — MORPHINE SULFATE 2 MG: 2 INJECTION, SOLUTION INTRAMUSCULAR; INTRAVENOUS at 20:28

## 2018-07-15 RX ADMIN — HEPARIN SODIUM 5000 UNITS: 5000 INJECTION, SOLUTION INTRAVENOUS; SUBCUTANEOUS at 18:12

## 2018-07-15 RX ADMIN — IOPAMIDOL 100 ML: 755 INJECTION, SOLUTION INTRAVENOUS at 16:36

## 2018-07-15 RX ADMIN — SODIUM CHLORIDE 1000 ML: 900 INJECTION, SOLUTION INTRAVENOUS at 15:22

## 2018-07-15 RX ADMIN — CEFTRIAXONE 1 G: 1 INJECTION, POWDER, FOR SOLUTION INTRAMUSCULAR; INTRAVENOUS at 20:06

## 2018-07-15 RX ADMIN — SODIUM CHLORIDE 100 ML: 900 INJECTION, SOLUTION INTRAVENOUS at 15:24

## 2018-07-15 RX ADMIN — HEPARIN SODIUM AND DEXTROSE 18 UNITS/KG/HR: 5000; 5 INJECTION INTRAVENOUS at 18:12

## 2018-07-15 RX ADMIN — MORPHINE SULFATE 6 MG: 2 INJECTION, SOLUTION INTRAMUSCULAR; INTRAVENOUS at 17:18

## 2018-07-15 RX ADMIN — HEPARIN SODIUM AND DEXTROSE 18 UNITS/KG/HR: 5000; 5 INJECTION INTRAVENOUS at 19:11

## 2018-07-15 RX ADMIN — Medication 10 ML: at 16:36

## 2018-07-15 RX ADMIN — MORPHINE SULFATE 4 MG: 2 INJECTION, SOLUTION INTRAMUSCULAR; INTRAVENOUS at 15:22

## 2018-07-15 RX ADMIN — Medication 10 ML: at 21:57

## 2018-07-15 RX ADMIN — DIATRIZOATE MEGLUMINE AND DIATRIZOATE SODIUM 15 ML: 600; 100 SOLUTION ORAL; RECTAL at 15:22

## 2018-07-15 RX ADMIN — ONDANSETRON 4 MG: 2 INJECTION INTRAMUSCULAR; INTRAVENOUS at 15:22

## 2018-07-15 NOTE — IP AVS SNAPSHOT
[facility] 
 
 
 [address] 
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 Please arrange for INR level lab test prior to visit [address] [facility] [address] 
[phone] Issa Jon MD On 8/2/2018 at [employer] [address] [facility] [address] [facility] [address] 
[phone] [facility] [address] [facility] [address] [place] [address] Care [facility] [address] 
[phone] Your Scheduled Appointments Thursday August 02, 2018  1:30 PM EDT New Patient with Issa Jon MD  
[facility] Hematology and Oncology [facility] ABDOUL/ Johan Torres [address] [facility] [address]  
[phone] Discharge Orders None A check yas indicates which time of day the medication should be taken. My Medications START taking these medications Instructions Each Dose to Equal  
 Morning Noon Evening Bedtime  
 oxyCODONE-acetaminophen 5-325 mg per tablet Commonly known as:  PERCOCET Your next dose is: Take on as needed schedule Take 1 Tab by mouth every four (4) hours as needed for Pain. Max Daily Amount: 6 Tabs. 1 Tab  
    
   
   
   
  
 warfarin 10 mg tablet Commonly known as:  COUMADIN Take 1 Tab by mouth daily. 10 mg CONTINUE taking these medications Instructions Each Dose to Equal  
 Morning Noon Evening Bedtime cyclobenzaprine 10 mg tablet Commonly known as:  FLEXERIL Take 1 Tab by mouth three (3) times daily as needed for Muscle Spasm(s). 10 mg  
    
  
   
  
   
  
   
  
 ibuprofen 600 mg tablet Commonly known as:  MOTRIN Your next dose is: Take on as needed schedule Take 1 Tab by mouth every six (6) hours as needed for Pain. 600 mg Where to Get Your Medications Information on where to get these meds will be given to you by the nurse or doctor. ! Ask your nurse or doctor about these medications  
  oxyCODONE-acetaminophen 5-325 mg per tablet  
 warfarin 10 mg tablet Opioid Education Prescription Opioids: What You Need to Know: 
 
 
After general anesthesia or intravenous sedation, for 24 hours or while taking prescription Narcotics: · Limit your activities · Do not drive and operate hazardous machinery · Do not make important personal or business decisions · Do  not drink alcoholic beverages · If you have not urinated within 8 hours after discharge, please contact your surgeon on call. Report the following to your surgeon: 
· Excessive pain, swelling, redness or odor of or around the surgical area · Temperature over 100.5 · Nausea and vomiting lasting longer than 4 hours or if unable to take medications · Any signs of decreased circulation or nerve impairment to extremity: change in color, persistent  numbness, tingling, coldness or increase pain · Any questions What to do at Home: 
Recommended activity: Activity as tolerated,  
 
 If you experience any of the following symptoms fever, chills, nausea or vomiting, new or unrelieved pain,reoccurence of bleeding or any other worrisome symptoms, please follow up with PCP. *  Please give a list of your current medications to your Primary Care Provider. *  Please update this list whenever your medications are discontinued, doses are 
    changed, or new medications (including over-the-counter products) are added. *  Please carry medication information at all times in case of emergency situations. These are general instructions for a healthy lifestyle: No smoking/ No tobacco products/ Avoid exposure to second hand smoke Surgeon General's Warning:  Quitting smoking now greatly reduces serious risk to your health. Obesity, smoking, and sedentary lifestyle greatly increases your risk for illness A healthy diet, regular physical exercise & weight monitoring are important for maintaining a healthy lifestyle You may be retaining fluid if you have a history of heart failure or if you experience any of the following symptoms:  Weight gain of 3 pounds or more overnight or 5 pounds in a week, increased swelling in our hands or feet or shortness of breath while lying flat in bed. Please call your doctor as soon as you notice any of these symptoms; do not wait until your next office visit. Recognize signs and symptoms of STROKE: 
 
F-face looks uneven A-arms unable to move or move unevenly S-speech slurred or non-existent T-time-call 911 as soon as signs and symptoms begin-DO NOT go Back to bed or wait to see if you get better-TIME IS BRAIN. Warning Signs of HEART ATTACK Call 911 if you have these symptoms: 
? Chest discomfort. Most heart attacks involve discomfort in the center of the chest that lasts more than a few minutes, or that goes away and comes back. It can feel like uncomfortable pressure, squeezing, fullness, or pain. ? Discomfort in other areas of the upper body. Symptoms can include pain or discomfort in one or both arms, the back, neck, jaw, or stomach. ? Shortness of breath with or without chest discomfort. ? Other signs may include breaking out in a cold sweat, nausea, or lightheadedness. Don't wait more than five minutes to call [address]! Fast action can save your life. Calling 911 is almost always the fastest way to get lifesaving treatment. Emergency Medical Services staff can begin treatment when they arrive  up to an hour sooner than if someone gets to the hospital by car. The discharge information has been reviewed with the patient. The patient verbalized understanding. Discharge medications reviewed with the patient and appropriate educational materials and side effects teaching were provided. ___________________________________________________________________________________________________________________________________ iFulfillmenthart Announcement We are excited to announce that we are making your provider's discharge notes available to you in XillianTV. You will see these notes when they are completed and signed by the physician that discharged you from your recent hospital stay. If you have any questions or concerns about any information you see in [employer], please call the Health Information Department where you were seen or reach out to your Primary Care Provider for more information about your plan of care. Introducing [facility]! [employer] introduces XillianTV patient portal. Now you can access parts of your medical record, email your doctor's office, and request medication refills online. 1. In your internet browser, go to [email or web address] 2. Click on the First Time User? Click Here link in the Sign In box. You will see the New Member Sign Up page. 3. Enter your XillianTV Access Code exactly as it appears below.  You will not need to use this code after youve completed the sign-up process. If you do not sign up before the expiration date, you must request a new code. · [employer] Access Code: [email or web address] Expires: 10/13/2018  2:07 PM 
 
4. Enter the last four digits of your Social Security Number (xxxx) and Date of Birth (mm/dd/yyyy) as indicated and click Submit. You will be taken to the next sign-up page. 5. Create a [employer] ID. This will be your [employer] login ID and cannot be changed, so think of one that is secure and easy to remember. 6. Create a [employer] password. You can change your password at any time. 7. Enter your Password Reset Question and Answer. This can be used at a later time if you forget your password. 8. Enter your e-mail address. You will receive e-mail notification when new information is available in [address] 9. Click Sign Up. You can now view and download portions of your medical record. 10. Click the Download Summary menu link to download a portable copy of your medical information. If you have questions, please visit the Frequently Asked Questions section of the [employer] website. Remember, [employer] is NOT to be used for urgent needs. For medical emergencies, dial 911. Now available from your iPhone and Android! Introducing Teo Rodriguez As a [employer] patient, I wanted to make you aware of our electronic visit tool called Teo Rodriguez. [employer] 24/7 allows you to connect within minutes with a medical provider 24 hours a day, seven days a week via a mobile device or tablet or logging into a secure website from your computer. You can access Teo Rodriguez from anywhere in the [place].  
 
A virtual visit might be right for you when you have a simple condition and feel like you just dont want to get out of bed, or cant get away from work for an appointment, when your regular [employer] provider is not available (evenings, weekends or holidays), or when youre out of town and need minor care. Electronic visits cost only $49 and if the [facility] 24/7 provider determines a prescription is needed to treat your condition, one can be electronically transmitted to a nearby pharmacy*. Please take a moment to enroll today if you have not already done so. The enrollment process is free and takes just a few minutes. To enroll, please download the [employer] 24/7 jarrett to your tablet or phone, or visit [email or web address] to enroll on your computer. And, as an [address] [place] [address] patient with a [employer] account, the results of your visits will be scanned into your electronic medical record and your primary care provider will be able to view the scanned results. We urge you to continue to see your [facility] provider for your ongoing medical care. And while your primary care provider may not be the one available when you seek a [employer] virtual visit, the peace of mind you get from getting a real diagnosis real time can be priceless. For more information on Vizy, view our Frequently Asked Questions (FAQs) at [email or web address]. Sincerely, 
 
Byron Bustillos MD 
Chief Medical Officer [facility] Hina Boone *:  certain medications cannot be prescribed via Vizy Unresulted Labs-Please follow up with your PCP about these lab tests Order Current Status PAROX NOCTURNAL HEMOGLOBINURIA In process MISC. LAB TEST Preliminary result Providers Seen During Your Hospitalization Provider Specialty Primary office phone Sade Moe MD Emergency Medicine [phone] Sanjuan Curling, MD Internal Medicine [phone] Your Primary Care Physician (PCP) Primary Care Physician Office Phone Office Fax Eileen Wiseman [phone] You are allergic to the following No active allergies Recent Documentation Height Weight BMI Smoking Status 1.727 m 73 kg 24.47 kg/m2 Current Every Day Smoker Emergency Contacts Name Discharge Info Relation Home Work Mobile [facility]  Life Partner [7] [phone] Patient Belongings The following personal items are in your possession at time of discharge: 
  Dental Appliances: None  Visual Aid: None      Home Medications: None   Jewelry: Ring  Clothing: Footwear, Hat, Shirt, Shorts, Socks, Undergarments, With patient    Other Valuables: Allen Sifuentes Discharge Instructions Attachments/References DVT (DEEP VEIN THROMBOSIS): GENERAL INFO (ENGLISH) WARFARIN SAFETY TIPS (ENGLISH) Patient Handouts Learning About Deep Vein Thrombosis What is deep vein thrombosis? A deep vein thrombosis (DVT) is a blood clot in certain veins of the legs, pelvis, or arms. The clot is usually in the legs. DVT may damage the vein and cause the area to ache, swell, and change color. DVT also can lead to sores. DVT in these veins needs to be treated because the clots can get bigger, break loose, and travel through the bloodstream to the lungs. A blood clot in a lung can cause death. Blood clots can form in the veins when you are not active for a long period of time. For example, they can form if you need to stay in bed because of a health problem or must sit for a long time on an airplane or in a car. Surgery or an injury can damage your blood vessels and cause a clot to form. Cancer also can cause DVT. And some people have blood that clots too easily, which is a problem that may run in families. A risk factor is something that makes you more likely to develop a disease. Here are some major risk factors for DVT: 
· You have surgery. · You have to stay in bed for more than 3 days (such as in the hospital). · Your blood is likely to clot because of an injury, cancer, or inherited condition. Here are some minor risk factors for DVT: 
· You take birth control hormones. · You are pregnant. · You are in a car or airplane for a long trip. What are the symptoms? Symptoms of DVT may include: · Swelling in the affected area. · Redness and warmth in the affected area. · Pain or tenderness. You may have pain only when you touch the affected area or when you stand or walk. If your doctor thinks you may have DVT, you will probably have an ultrasound test. You may have other tests as well. How can you prevent DVT? · Exercise your lower leg muscles to help blood flow in your legs. Point your toes up toward your head so the calves of your legs are stretched, then relax and repeat. This is a good exercise to do when you are sitting for long periods of time. · Get out of bed as soon as you can after an illness or surgery. If you need to stay in bed, do the leg exercise noted above every hour when you are awake. · Use special stockings called compression stockings. These stockings are tight at the feet with a gradually looser fit on the leg. Many doctors recommend that you wear compression stockings during a journey longer than 8 hours. · Take breaks when you are on long trips. Stop the car and walk around. On long airplane flights, walk up and down the aisle hourly, and flex and point your feet every 20 minutes while sitting. · Take blood-thinning medicines after some types of surgery if your doctor recommends it. Blood thinners also may be used if you are likely to develop clots. How is DVT treated? Treatment for DVT usually involves taking blood thinners. These medicines are given through a vein (intravenously, or IV) or as a pill. Talk with your doctor about which medicine is right for you. Your doctor also may suggest that you prop up or elevate your leg when possible, take walks, and wear compression stockings. These measures may help reduce the pain and swelling that can happen with DVT. Follow-up care is a key part of your treatment and safety. Be sure to make and go to all appointments, and call your doctor if you are having problems. It's also a good idea to know your test results and keep a list of the medicines you take. Where can you learn more? Go to http://jenni-davidson.info/. Enter U850 in the search box to learn more about \"Learning About Deep Vein Thrombosis. \" Current as of: November 21, 2017 Content Version: 11.7 © 4940-8114 [employer]. Care instructions adapted under license by [employer] (which disclaims liability or warranty for this information). If you have questions about a medical condition or this instruction, always ask your healthcare professional. [address] any warranty or liability for your use of this information. Taking Warfarin Safely: Care Instructions Your Care Instructions Warfarin is a medicine that you take to prevent blood clots. It is often called a blood thinner. Doctors give warfarin (such as Coumadin) to reduce the risk of blood clots. You may be at risk for blood clots if you have atrial fibrillation or deep vein thrombosis. Some other health problems may also put you at risk. Warfarin slows the amount of time it takes for your blood to clot. It can cause bleeding problems. Even if you've been taking warfarin for a while, it's important to know how to take it safely. Foods and other medicines can affect the way warfarin works. Some can make warfarin work too well. This can cause bleeding problems. And some can make it work poorly, so that it does not prevent blood clots very well. You will need regular blood tests to check how long it takes for your blood to form a clot. This test is called a PT or prothrombin time test. The result of the test is called an INR level.  Depending on the test results, your doctor or anticoagulation clinic may adjust your dose of warfarin. Follow-up care is a key part of your treatment and safety. Be sure to make and go to all appointments, and call your doctor if you are having problems. It's also a good idea to know your test results and keep a list of the medicines you take. How can you care for yourself at home? Take warfarin safely · Take your warfarin at the same time each day. · If you miss a dose of warfarin, don't take an extra dose to make up for it. Your doctor can tell you exactly what to do so you don't take too much or too little. · Wear medical alert jewelry that lets others know that you take warfarin. You can buy this at most drugstores. · Don't take warfarin if you are pregnant or planning to get pregnant. Talk to your doctor about how you can prevent getting pregnant while you are taking it. · Don't change your dose or stop taking warfarin unless your doctor tells you to. Effects of medicines and food on warfarin · Don't start or stop taking any medicines, vitamins, or natural remedies unless you first talk to your doctor. Many medicines can affect how warfarin works. These include aspirin and other pain relievers, over-the-counter medicines, multivitamins, dietary supplements, and herbal products. · Tell all of your doctors and pharmacists that you take warfarin. Some prescription medicines can affect how warfarin works. · Keep the amount of vitamin K in your diet about the same from day to day. Do not suddenly eat a lot more or a lot less food that is rich in vitamin K than you usually do. Vitamin K affects how warfarin works and how your blood clots. Talk with your doctor before making big changes in your diet. Foods that have a lot of vitamin K include cooked green vegetables, such as: ¨ Kale, spinach, turnip greens, bin greens, Swiss chard, and mustard greens. ¨ Cotton Plant sprouts, broccoli, and asparagus. · Limit your use of alcohol. Avoid bleeding by preventing falls and injuries · Wear slippers or shoes with nonskid soles. · Remove throw rugs and clutter. · Rearrange furniture and electrical cords to keep them out of walking paths. · Keep stairways, porches, and outside walkways well lit. Use night-lights in hallways and bathrooms. · Be extra careful when you work with sharp tools or knives. When should you call for help? Call 911 anytime you think you may need emergency care. For example, call if: 
  · You have a sudden, severe headache that is different from past headaches.  
 Call your doctor now or seek immediate medical care if: 
  · You have any abnormal bleeding, such as: 
¨ Nosebleeds. ¨ Vaginal bleeding that is different (heavier, more frequent, at a different time of the month) than what you are used to. ¨ Bloody or black stools, or rectal bleeding. ¨ Bloody or pink urine.  
 Watch closely for changes in your health, and be sure to contact your doctor if you have any problems. Where can you learn more? Go to http://jenni-davidson.info/. Enter L852 in the search box to learn more about \"Taking Warfarin Safely: Care Instructions. \" Current as of: December 6, 2017 Content Version: 11.7 © 1497-9613 [employer]. Care instructions adapted under license by [employer] (which disclaims liability or warranty for this information). If you have questions about a medical condition or this instruction, always ask your healthcare professional. [address] any warranty or liability for your use of this information. Please provide this summary of care documentation to your next provider. Signatures-by signing, you are acknowledging that this After Visit Summary has been reviewed with you and you have received a copy. Patient Signature:  ____________________________________________________________  Date:  ____________________________________________________________  
  
Rajni Jang    
 Provider Signature:  ____________________________________________________________ Date:  ____________________________________________________________

## 2018-07-15 NOTE — H&P
Hospitalist H&P Note Admit Date:  7/15/2018  2:11 PM  
Name:  Angelo Hess Age:  50 y.o. 
:  1970 MRN:  [id number] PCP:  Grzegorz Bauman MD 
Treatment Team: Attending Provider: Corin Noble MD; Primary Nurse: Virginia Duenas RN 
 
HPI:  
 
Patient is a 48yo  AAM with PMH of Gastric perforation with abdominal surgery , recent colonoscopy with polyp removal, smokes 1/2 pack day cigarettes, and drinks 4-6 beers per day, who came into the ER with complaints of 10/10 abdominal pain, associated with nausea, vomiting, chills/sweats  x 4 days. Patient reports lower back pain and BM this morning. Denies diarrhea, CP, SOB, HA, or fever. CT findings of large thrombus within the IVC, possibly extending into iliac veins. Vascular and IR contacted by ER. Heparin drip started. UA positive and rocephin started. WBC 4.3. Lipase 34. BP  151/101. Patient admitted to IP remote telemetry. 10 systems reviewed and negative except as noted in HPI. Past Medical History:  
Diagnosis Date  Chronic kidney disease Past Surgical History:  
Procedure Laterality Date  HX GI  10/3/14  
 gastric perforation  HX ORTHOPAEDIC Left   
 arm No Known Allergies Social History Substance Use Topics  Smoking status: Current Every Day Smoker Packs/day: 0.50  Smokeless tobacco: Not on file  Alcohol use Yes Family History Problem Relation Age of Onset  Cancer Mother   
  colon  Cancer Father   
  liver There is no immunization history on file for this patient. PTA Medications: 
Prior to Admission Medications Prescriptions Last Dose Informant Patient Reported? Taking? cyclobenzaprine (FLEXERIL) 10 mg tablet   No No  
Sig: Take 1 Tab by mouth three (3) times daily as needed for Muscle Spasm(s). ibuprofen (MOTRIN) 600 mg tablet   No No  
Sig: Take 1 Tab by mouth every six (6) hours as needed for Pain.   
  
Facility-Administered Medications: None Objective:  
Patient Vitals for the past 24 hrs: 
 Temp Pulse Resp BP SpO2  
07/15/18 1429 - 61 - (!) 152/101 99 % 07/15/18 1418 - - - (!) 161/100 -  
07/15/18 1408 98.9 °F (37.2 °C) 81 16 (!) 130/93 94 % Oxygen Therapy O2 Sat (%): 99 % (07/15/18 1429) Pulse via Oximetry: 61 beats per minute (07/15/18 1429) O2 Device: Room air (07/15/18 1408) No intake or output data in the 24 hours ending 07/15/18 1829 Physical Exam: 
General:    Well nourished. Alert. Eyes:   Red sclera. Extraocular movements intact. ENT:  Normocephalic, atraumatic. Moist mucous membranes CV:   RRR. No m/r/g. Peripheral pulses 2+. Capillary refill <2s. Lungs:  CTAB. No wheezing, rhonchi, or rales. Room air Abdomen: Soft, nondistended. perumbilical tenderness. Bowel sounds normal.  
Extremities: Warm and dry. No cyanosis or edema. Neurologic: CN II-XII grossly intact. Sensation intact. Skin:     No rashes or jaundice. Normal coloration Psych:  Normal mood and affect. I reviewed the labs, imaging, EKGs, telemetry, and other studies done this admission. Data Review:  
Recent Results (from the past 24 hour(s)) CBC WITH AUTOMATED DIFF Collection Time: 07/15/18  2:13 PM  
Result Value Ref Range WBC 4.3 4.3 - 11.1 K/uL  
 RBC 4.43 4.23 - 5.67 M/uL  
 HGB 14.7 13.6 - 17.2 g/dL HCT 43.5 41.1 - 50.3 % MCV 98.2 (H) 79.6 - 97.8 FL  
 MCH 33.2 (H) 26.1 - 32.9 PG  
 MCHC 33.8 31.4 - 35.0 g/dL  
 RDW 13.2 11.9 - 14.6 % PLATELET 316 670 - 773 K/uL MPV 9.5 (L) 10.8 - 14.1 FL  
 DF AUTOMATED NEUTROPHILS 46 43 - 78 % LYMPHOCYTES 40 13 - 44 % MONOCYTES 13 (H) 4.0 - 12.0 % EOSINOPHILS 1 0.5 - 7.8 % BASOPHILS 0 0.0 - 2.0 % IMMATURE GRANULOCYTES 0 0.0 - 5.0 %  
 ABS. NEUTROPHILS 2.0 1.7 - 8.2 K/UL  
 ABS. LYMPHOCYTES 1.7 0.5 - 4.6 K/UL  
 ABS. MONOCYTES 0.5 0.1 - 1.3 K/UL  
 ABS. EOSINOPHILS 0.0 0.0 - 0.8 K/UL  
 ABS. BASOPHILS 0.0 0.0 - 0.2 K/UL  
 ABS. IMM.  GRANS. 0.0 0.0 - 0.5 K/UL METABOLIC PANEL, COMPREHENSIVE Collection Time: 07/15/18  2:13 PM  
Result Value Ref Range Sodium 139 136 - 145 mmol/L Potassium 5.1 3.5 - 5.1 mmol/L Chloride 105 98 - 107 mmol/L  
 CO2 24 21 - 32 mmol/L Anion gap 10 7 - 16 mmol/L Glucose 109 (H) 65 - 100 mg/dL BUN 15 6 - 23 MG/DL Creatinine 1.16 0.8 - 1.5 MG/DL  
 GFR est AA >60 >60 ml/min/1.73m2 GFR est non-AA >60 >60 ml/min/1.73m2 Calcium 8.6 8.3 - 10.4 MG/DL Bilirubin, total 1.2 (H) 0.2 - 1.1 MG/DL  
 ALT (SGPT) 40 12 - 65 U/L  
 AST (SGOT) 54 (H) 15 - 37 U/L Alk. phosphatase 70 50 - 136 U/L Protein, total 8.0 6.3 - 8.2 g/dL Albumin 3.7 3.5 - 5.0 g/dL Globulin 4.3 (H) 2.3 - 3.5 g/dL A-G Ratio 0.9 (L) 1.2 - 3.5 LIPASE Collection Time: 07/15/18  2:13 PM  
Result Value Ref Range Lipase 34 (L) 73 - 393 U/L  
URINE MICROSCOPIC Collection Time: 07/15/18  4:35 PM  
Result Value Ref Range WBC 5-10 0 /hpf  
 RBC 0-3 0 /hpf Epithelial cells 0-3 0 /hpf Bacteria 4+ (H) 0 /hpf Casts 0-3 0 /lpf All Micro Results None Other Studies: 
Ct Abd Pelv W Cont Result Date: 7/15/2018 CT ABDOMEN AND PELVIS WITH IV CONTRAST HISTORY:  Abdominal pain and burning. COMPARISON:  CT abdomen and pelvis October 3, 8855 TECHNIQUE:  Helical scans through the abdomen and pelvis with oral and IV contrast, Isovue-370, 100 cc  IV to improve conspicuity of infection and neoplasia. Radiation dose reduction techniques were used for this study: All CT scans performed at this facility use one or all of the following: Automated exposure control, adjustment of the mA and/or kVp according to patient's size, iterative reconstruction. CT ABDOMEN:  Bowel loops are not dilated. No abnormal extraluminal gas or fluid. Kidneys demonstrate normal enhancement and negative for evidence of urinary obstruction.  Large right lobe and smaller left lobe lesions with peripheral noncontiguous enhancement most consistent with hemangiomas, unchanged. No new liver lesions. IVC normal caliber. There is well-defined filling defect in the IVC extending from the renal vein level caudally and into the left common iliac vein. Unfortunately the iliac veins and femoral veins are not opacified, probably related to bolus timing. CT PELVIS:  No free fluid in the pelvis. No mass or lymphadenopathy. IMPRESSION:  1. Normal bowel gas pattern. 2. Large thrombus within the IVC, possibly extending into the iliac veins. 3. No change liver hemangiomas. Shyrl Howell The critical results contained in this report were communicated to the ED physician, Dr. Trinity Schulz by myself, Dr. Eagle Naqvi on 7/15/2018 at 1650 hours. Critical results were communicated as outlined in Section II.C.2.a.i of the ACR Practice Guideline for Communication of Diagnostic Imaging Findings. Assessment and Plan:  
 
Hospital Problems as of 7/15/2018  Date Reviewed: 10/3/2014 Codes Class Noted - Resolved POA Abdominal pain ICD-10-CM: R10.9 ICD-9-CM: 789.00  7/15/2018 - Present Unknown PLAN: 
IVC thrombus 
-admit to IP with remote telemetry 
-Heparin gtt 
-Consult Vascular 
-Consult IR Abdominal pain 
-PRN pain medication 
-PRN nausea medication UTI 
-Rocephin 
-Follow culture UDS PRN hydralazine for hypertension Monitor for ETOH withdrawal 
PRN ativan for withdrawal symptoms Smoking cessation education Nicoderm patch Discharge planning:  home DVT ppx: heparin gtt Code status:  Full Estimated LOS:  Greater than 2 midnights Risk:  high Plan of care discussed with Dr. Lena Morelos Signed: 
Vinnie Mcgovern NP

## 2018-07-15 NOTE — ED PROVIDER NOTES
HPI Comments: iin 2014 patient had a gastric perforation with abdominal surgery. For the past 4 days has had abdominal pain periumbilical lateral to the incision site sharp and achy in nature. Said some nausea and vomiting. No diarrhea or constipation. No dysuria. Patient is a 50 y.o. male presenting with abdominal pain. The history is provided by the patient. No  was used. Abdominal Pain    This is a new problem. The current episode started more than 2 days ago. The problem occurs constantly. The problem has been gradually worsening. The pain is associated with an unknown factor. The pain is located in the periumbilical region. The quality of the pain is aching and sharp. The pain is moderate. Associated symptoms include nausea and vomiting. Pertinent negatives include no fever, no diarrhea, no melena, no constipation, no dysuria, no hematuria, no headaches, no chest pain and no back pain. Nothing worsens the pain. The pain is relieved by nothing. Past Medical History:   Diagnosis Date    Chronic kidney disease        Past Surgical History:   Procedure Laterality Date    HX GI  10/3/14    gastric perforation    HX ORTHOPAEDIC Left     arm         Family History:   Problem Relation Age of Onset    Cancer Mother      colon    Cancer Father      liver       Social History     Social History    Marital status: LIFE PARTNER     Spouse name: N/A    Number of children: N/A    Years of education: N/A     Occupational History    Not on file. Social History Main Topics    Smoking status: Current Every Day Smoker     Packs/day: 0.50    Smokeless tobacco: Not on file    Alcohol use Yes    Drug use: Not on file    Sexual activity: Not on file     Other Topics Concern    Not on file     Social History Narrative         ALLERGIES: Review of patient's allergies indicates no known allergies. Review of Systems   Constitutional: Negative for chills and fever.    HENT: Negative for rhinorrhea and sore throat. Eyes: Negative for pain and redness. Respiratory: Negative for chest tightness, shortness of breath and wheezing. Cardiovascular: Negative for chest pain and leg swelling. Gastrointestinal: Positive for abdominal pain, nausea and vomiting. Negative for constipation, diarrhea and melena. Genitourinary: Negative for dysuria and hematuria. Musculoskeletal: Negative for back pain, gait problem, neck pain and neck stiffness. Skin: Negative for color change and rash. Neurological: Negative for weakness, numbness and headaches. Vitals:    07/15/18 1408 07/15/18 1418 07/15/18 1429   BP: (!) 130/93 (!) 161/100 (!) 152/101   Pulse: 81  61   Resp: 16     Temp: 98.9 °F (37.2 °C)     SpO2: 94%  99%   Weight: 72.6 kg (160 lb)     Height: 5' 8\" (1.727 m)              Physical Exam   Constitutional: He is oriented to person, place, and time. He appears well-developed and well-nourished. HENT:   Head: Normocephalic and atraumatic. Neck: Normal range of motion. Neck supple. Cardiovascular: Normal rate and regular rhythm. No murmur heard. Pulmonary/Chest: Effort normal and breath sounds normal. He has no wheezes. Abdominal: Soft. Bowel sounds are normal. There is tenderness (periumbilical. ). There is no rebound and no guarding. Musculoskeletal: Normal range of motion. He exhibits no edema. Neurological: He is alert and oriented to person, place, and time. Skin: Skin is warm and dry. Nursing note and vitals reviewed. MDM  Number of Diagnoses or Management Options  Inferior vena cava thromboembolism, acute [facility]):   Diagnosis management comments: IVC thrombus. Will admit. Vascular consulted. Wanted IR called and admit to hospitalist. Discussed with Dr. Ariana sapp who will follow patient with hospitalist admitting and request heparin be started.        Amount and/or Complexity of Data Reviewed  Clinical lab tests: ordered and reviewed  Tests in the radiology section of CPT®: ordered and reviewed  Tests in the medicine section of CPT®: ordered and reviewed    Patient Progress  Patient progress: stable        ED Course       Procedures      Results Include:    Recent Results (from the past 24 hour(s))   CBC WITH AUTOMATED DIFF    Collection Time: 07/15/18  2:13 PM   Result Value Ref Range    WBC 4.3 4.3 - 11.1 K/uL    RBC 4.43 4.23 - 5.67 M/uL    HGB 14.7 13.6 - 17.2 g/dL    HCT 43.5 41.1 - 50.3 %    MCV 98.2 (H) 79.6 - 97.8 FL    MCH 33.2 (H) 26.1 - 32.9 PG    MCHC 33.8 31.4 - 35.0 g/dL    RDW 13.2 11.9 - 14.6 %    PLATELET 593 682 - 508 K/uL    MPV 9.5 (L) 10.8 - 14.1 FL    DF AUTOMATED      NEUTROPHILS 46 43 - 78 %    LYMPHOCYTES 40 13 - 44 %    MONOCYTES 13 (H) 4.0 - 12.0 %    EOSINOPHILS 1 0.5 - 7.8 %    BASOPHILS 0 0.0 - 2.0 %    IMMATURE GRANULOCYTES 0 0.0 - 5.0 %    ABS. NEUTROPHILS 2.0 1.7 - 8.2 K/UL    ABS. LYMPHOCYTES 1.7 0.5 - 4.6 K/UL    ABS. MONOCYTES 0.5 0.1 - 1.3 K/UL    ABS. EOSINOPHILS 0.0 0.0 - 0.8 K/UL    ABS. BASOPHILS 0.0 0.0 - 0.2 K/UL    ABS. IMM. GRANS. 0.0 0.0 - 0.5 K/UL   METABOLIC PANEL, COMPREHENSIVE    Collection Time: 07/15/18  2:13 PM   Result Value Ref Range    Sodium 139 136 - 145 mmol/L    Potassium 5.1 3.5 - 5.1 mmol/L    Chloride 105 98 - 107 mmol/L    CO2 24 21 - 32 mmol/L    Anion gap 10 7 - 16 mmol/L    Glucose 109 (H) 65 - 100 mg/dL    BUN 15 6 - 23 MG/DL    Creatinine 1.16 0.8 - 1.5 MG/DL    GFR est AA >60 >60 ml/min/1.73m2    GFR est non-AA >60 >60 ml/min/1.73m2    Calcium 8.6 8.3 - 10.4 MG/DL    Bilirubin, total 1.2 (H) 0.2 - 1.1 MG/DL    ALT (SGPT) 40 12 - 65 U/L    AST (SGOT) 54 (H) 15 - 37 U/L    Alk.  phosphatase 70 50 - 136 U/L    Protein, total 8.0 6.3 - 8.2 g/dL    Albumin 3.7 3.5 - 5.0 g/dL    Globulin 4.3 (H) 2.3 - 3.5 g/dL    A-G Ratio 0.9 (L) 1.2 - 3.5     LIPASE    Collection Time: 07/15/18  2:13 PM   Result Value Ref Range    Lipase 34 (L) 73 - 393 U/L      CT ABD PELV W CONT (Final result) Result time: 07/15/18 16:56:45     Final result by Jacquelin Ruff MD (07/15/18 16:56:45)     Impression:     IMPRESSION:    1. Normal bowel gas pattern. 2. Large thrombus within the IVC, possibly extending into the iliac veins. 3. No change liver hemangiomas. [facility]  The critical results contained in this report were communicated to the ED  physician, Dr. Nolan Shah by myself, Dr. Melissa Mauricio on 7/15/2018 at 1650 hours. Critical  results were communicated as outlined in Section II.C.2.a.i of the ACR Practice  Guideline for Communication of Diagnostic Imaging Findings.             Narrative:     CT ABDOMEN AND PELVIS WITH IV CONTRAST     HISTORY:  Abdominal pain and burning. COMPARISON:  CT abdomen and pelvis October 3, 2014    TECHNIQUE:  Helical scans through the abdomen and pelvis with oral and IV  contrast, Isovue-370, 100 cc  IV to improve conspicuity of infection and  neoplasia.  Radiation dose reduction techniques were used for this study:  All  CT scans performed at this facility use one or all of the following: Automated  exposure control, adjustment of the mA and/or kVp according to patient's size,  iterative reconstruction. CT ABDOMEN:  Bowel loops are not dilated. No abnormal extraluminal gas or fluid. Kidneys demonstrate normal enhancement and negative for evidence of urinary  obstruction. Large right lobe and smaller left lobe lesions with peripheral  noncontiguous enhancement most consistent with hemangiomas, unchanged. No new  liver lesions. IVC normal caliber. There is well-defined filling defect in the IVC extending from the renal vein  level caudally and into the left common iliac vein. Unfortunately the iliac  veins and femoral veins are not opacified, probably related to bolus timing. CT PELVIS:  No free fluid in the pelvis.  No mass or lymphadenopathy.

## 2018-07-15 NOTE — ED NOTES
TRANSFER - OUT REPORT:    Verbal report given to Ganesh Vincent on Karsten Horton  being transferred to [facility] for routine progression of care       Report consisted of patients Situation, Background, Assessment and   Recommendations(SBAR). Information from the following report(s) SBAR, MAR and Recent Results was reviewed with the receiving nurse. Lines:   Peripheral IV Right Antecubital (Active)   Site Assessment Clean, dry, & intact 7/15/2018  2:18 PM   Phlebitis Assessment 0 7/15/2018  2:18 PM   Infiltration Assessment 0 7/15/2018  2:18 PM   Dressing Status Clean, dry, & intact 7/15/2018  2:18 PM   Dressing Type Transparent 7/15/2018  2:18 PM        Opportunity for questions and clarification was provided.

## 2018-07-16 ENCOUNTER — APPOINTMENT (OUTPATIENT)
Dept: ULTRASOUND IMAGING | Age: 48
DRG: 271 | End: 2018-07-16
Attending: RADIOLOGY
Payer: SELF-PAY

## 2018-07-16 ENCOUNTER — APPOINTMENT (OUTPATIENT)
Dept: INTERVENTIONAL RADIOLOGY/VASCULAR | Age: 48
DRG: 271 | End: 2018-07-16
Attending: INTERNAL MEDICINE
Payer: SELF-PAY

## 2018-07-16 LAB
ANION GAP SERPL CALC-SCNC: 9 MMOL/L (ref 7–16)
APTT PPP: 64.9 SEC (ref 23.2–35.3)
APTT PPP: 83.6 SEC (ref 23.2–35.3)
BUN SERPL-MCNC: 10 MG/DL (ref 6–23)
CALCIUM SERPL-MCNC: 8.6 MG/DL (ref 8.3–10.4)
CHLORIDE SERPL-SCNC: 104 MMOL/L (ref 98–107)
CO2 SERPL-SCNC: 28 MMOL/L (ref 21–32)
CREAT SERPL-MCNC: 1.04 MG/DL (ref 0.8–1.5)
ERYTHROCYTE [DISTWIDTH] IN BLOOD BY AUTOMATED COUNT: 12.9 % (ref 11.9–14.6)
GLUCOSE SERPL-MCNC: 82 MG/DL (ref 65–100)
HCT VFR BLD AUTO: 41.6 % (ref 41.1–50.3)
HGB BLD-MCNC: 13.8 G/DL (ref 13.6–17.2)
MCH RBC QN AUTO: 33 PG (ref 26.1–32.9)
MCHC RBC AUTO-ENTMCNC: 33.2 G/DL (ref 31.4–35)
MCV RBC AUTO: 99.5 FL (ref 79.6–97.8)
PLATELET # BLD AUTO: 266 K/UL (ref 150–450)
PMV BLD AUTO: 9.6 FL (ref 10.8–14.1)
POTASSIUM SERPL-SCNC: 3.5 MMOL/L (ref 3.5–5.1)
RBC # BLD AUTO: 4.18 M/UL (ref 4.23–5.67)
SODIUM SERPL-SCNC: 141 MMOL/L (ref 136–145)
WBC # BLD AUTO: 4.8 K/UL (ref 4.3–11.1)

## 2018-07-16 PROCEDURE — 74011250636 HC RX REV CODE- 250/636: Performed by: NURSE PRACTITIONER

## 2018-07-16 PROCEDURE — 74011636320 HC RX REV CODE- 636/320: Performed by: RADIOLOGY

## 2018-07-16 PROCEDURE — 80048 BASIC METABOLIC PNL TOTAL CA: CPT | Performed by: NURSE PRACTITIONER

## 2018-07-16 PROCEDURE — 37211 THROMBOLYTIC ART THERAPY: CPT

## 2018-07-16 PROCEDURE — 77030004629 HC CATH ANGI SZ AUR COOK -B

## 2018-07-16 PROCEDURE — C1880 VENA CAVA FILTER: HCPCS

## 2018-07-16 PROCEDURE — 06H03DZ INSERTION OF INTRALUMINAL DEVICE INTO INFERIOR VENA CAVA, PERCUTANEOUS APPROACH: ICD-10-PCS | Performed by: RADIOLOGY

## 2018-07-16 PROCEDURE — C1769 GUIDE WIRE: HCPCS

## 2018-07-16 PROCEDURE — C1887 CATHETER, GUIDING: HCPCS

## 2018-07-16 PROCEDURE — 77030019605

## 2018-07-16 PROCEDURE — 85027 COMPLETE CBC AUTOMATED: CPT | Performed by: NURSE PRACTITIONER

## 2018-07-16 PROCEDURE — B51G1ZZ FLUOROSCOPY OF LEFT PELVIC (ILIAC) VEINS USING LOW OSMOLAR CONTRAST: ICD-10-PCS | Performed by: RADIOLOGY

## 2018-07-16 PROCEDURE — 77030020263 HC SOL INJ SOD CL0.9% LFCR 1000ML

## 2018-07-16 PROCEDURE — 74011250636 HC RX REV CODE- 250/636: Performed by: RADIOLOGY

## 2018-07-16 PROCEDURE — 74011250636 HC RX REV CODE- 250/636: Performed by: EMERGENCY MEDICINE

## 2018-07-16 PROCEDURE — 74011000250 HC RX REV CODE- 250: Performed by: RADIOLOGY

## 2018-07-16 PROCEDURE — 74011250637 HC RX REV CODE- 250/637: Performed by: RADIOLOGY

## 2018-07-16 PROCEDURE — 93970 EXTREMITY STUDY: CPT

## 2018-07-16 PROCEDURE — C1751 CATH, INF, PER/CENT/MIDLINE: HCPCS

## 2018-07-16 PROCEDURE — 65610000001 HC ROOM ICU GENERAL

## 2018-07-16 PROCEDURE — C1894 INTRO/SHEATH, NON-LASER: HCPCS

## 2018-07-16 PROCEDURE — 36415 COLL VENOUS BLD VENIPUNCTURE: CPT | Performed by: NURSE PRACTITIONER

## 2018-07-16 PROCEDURE — 85730 THROMBOPLASTIN TIME PARTIAL: CPT | Performed by: INTERNAL MEDICINE

## 2018-07-16 PROCEDURE — 74011250637 HC RX REV CODE- 250/637: Performed by: NURSE PRACTITIONER

## 2018-07-16 PROCEDURE — 06H033Z INSERTION OF INFUSION DEVICE INTO INFERIOR VENA CAVA, PERCUTANEOUS APPROACH: ICD-10-PCS | Performed by: RADIOLOGY

## 2018-07-16 PROCEDURE — 74011250636 HC RX REV CODE- 250/636

## 2018-07-16 PROCEDURE — B5191ZZ FLUOROSCOPY OF INFERIOR VENA CAVA USING LOW OSMOLAR CONTRAST: ICD-10-PCS | Performed by: RADIOLOGY

## 2018-07-16 RX ORDER — HEPARIN SODIUM 5000 [USP'U]/100ML
400-500 INJECTION, SOLUTION INTRAVENOUS CONTINUOUS
Status: DISCONTINUED | OUTPATIENT
Start: 2018-07-16 | End: 2018-07-16

## 2018-07-16 RX ORDER — DIPHENHYDRAMINE HYDROCHLORIDE 50 MG/ML
25-50 INJECTION, SOLUTION INTRAMUSCULAR; INTRAVENOUS ONCE
Status: COMPLETED | OUTPATIENT
Start: 2018-07-16 | End: 2018-07-16

## 2018-07-16 RX ORDER — HEPARIN SODIUM 5000 [USP'U]/100ML
300 INJECTION, SOLUTION INTRAVENOUS CONTINUOUS
Status: DISCONTINUED | OUTPATIENT
Start: 2018-07-16 | End: 2018-07-17

## 2018-07-16 RX ORDER — SODIUM CHLORIDE 9 MG/ML
100 INJECTION, SOLUTION INTRAVENOUS CONTINUOUS
Status: DISCONTINUED | OUTPATIENT
Start: 2018-07-16 | End: 2018-07-24 | Stop reason: HOSPADM

## 2018-07-16 RX ORDER — LIDOCAINE HYDROCHLORIDE 10 MG/ML
2-10 INJECTION INFILTRATION; PERINEURAL ONCE
Status: COMPLETED | OUTPATIENT
Start: 2018-07-16 | End: 2018-07-16

## 2018-07-16 RX ORDER — HYDROCODONE BITARTRATE AND ACETAMINOPHEN 7.5; 325 MG/1; MG/1
1 TABLET ORAL
Status: DISCONTINUED | OUTPATIENT
Start: 2018-07-16 | End: 2018-07-17

## 2018-07-16 RX ORDER — HEPARIN SODIUM 200 [USP'U]/100ML
1000 INJECTION, SOLUTION INTRAVENOUS CONTINUOUS
Status: DISCONTINUED | OUTPATIENT
Start: 2018-07-16 | End: 2018-07-16

## 2018-07-16 RX ORDER — SODIUM CHLORIDE 9 MG/ML
25 INJECTION, SOLUTION INTRAVENOUS ONCE
Status: COMPLETED | OUTPATIENT
Start: 2018-07-16 | End: 2018-07-17

## 2018-07-16 RX ORDER — HEPARIN SODIUM 5000 [USP'U]/ML
35 INJECTION, SOLUTION INTRAVENOUS; SUBCUTANEOUS ONCE
Status: COMPLETED | OUTPATIENT
Start: 2018-07-16 | End: 2018-07-16

## 2018-07-16 RX ORDER — ZOLPIDEM TARTRATE 5 MG/1
5 TABLET ORAL
Status: DISCONTINUED | OUTPATIENT
Start: 2018-07-16 | End: 2018-07-24 | Stop reason: HOSPADM

## 2018-07-16 RX ORDER — MIDAZOLAM HYDROCHLORIDE 1 MG/ML
.5-2 INJECTION, SOLUTION INTRAMUSCULAR; INTRAVENOUS
Status: DISCONTINUED | OUTPATIENT
Start: 2018-07-16 | End: 2018-07-16 | Stop reason: ALTCHOICE

## 2018-07-16 RX ORDER — FENTANYL CITRATE 50 UG/ML
25-50 INJECTION, SOLUTION INTRAMUSCULAR; INTRAVENOUS
Status: DISCONTINUED | OUTPATIENT
Start: 2018-07-16 | End: 2018-07-16 | Stop reason: ALTCHOICE

## 2018-07-16 RX ADMIN — MORPHINE SULFATE 2 MG: 2 INJECTION, SOLUTION INTRAMUSCULAR; INTRAVENOUS at 08:35

## 2018-07-16 RX ADMIN — HEPARIN SODIUM 2000 UNITS: 200 INJECTION, SOLUTION INTRAVENOUS at 18:20

## 2018-07-16 RX ADMIN — FENTANYL CITRATE 50 MCG: 50 INJECTION, SOLUTION INTRAMUSCULAR; INTRAVENOUS at 18:44

## 2018-07-16 RX ADMIN — ALTEPLASE 1 MG/HR: KIT at 19:38

## 2018-07-16 RX ADMIN — HEPARIN SODIUM AND DEXTROSE 19.49 UNITS/KG/HR: 5000; 5 INJECTION INTRAVENOUS at 07:44

## 2018-07-16 RX ADMIN — MIDAZOLAM HYDROCHLORIDE 0.5 MG: 1 INJECTION, SOLUTION INTRAMUSCULAR; INTRAVENOUS at 18:29

## 2018-07-16 RX ADMIN — SODIUM CHLORIDE 25 ML/HR: 900 INJECTION, SOLUTION INTRAVENOUS at 18:00

## 2018-07-16 RX ADMIN — HEPARIN SODIUM AND DEXTROSE 300 UNITS/HR: 5000; 5 INJECTION INTRAVENOUS at 19:16

## 2018-07-16 RX ADMIN — Medication 10 ML: at 22:33

## 2018-07-16 RX ADMIN — ALTEPLASE 1 MG/HR: KIT at 19:12

## 2018-07-16 RX ADMIN — FENTANYL CITRATE 25 MCG: 50 INJECTION, SOLUTION INTRAMUSCULAR; INTRAVENOUS at 18:06

## 2018-07-16 RX ADMIN — MIDAZOLAM HYDROCHLORIDE 0.5 MG: 1 INJECTION, SOLUTION INTRAMUSCULAR; INTRAVENOUS at 18:44

## 2018-07-16 RX ADMIN — HEPARIN SODIUM AND DEXTROSE 19.49 UNITS/KG/HR: 5000; 5 INJECTION INTRAVENOUS at 12:54

## 2018-07-16 RX ADMIN — MORPHINE SULFATE 2 MG: 2 INJECTION, SOLUTION INTRAMUSCULAR; INTRAVENOUS at 22:30

## 2018-07-16 RX ADMIN — LIDOCAINE HYDROCHLORIDE 5 ML: 10 INJECTION, SOLUTION INFILTRATION; PERINEURAL at 18:26

## 2018-07-16 RX ADMIN — SODIUM CHLORIDE 100 ML/HR: 900 INJECTION, SOLUTION INTRAVENOUS at 03:10

## 2018-07-16 RX ADMIN — MORPHINE SULFATE 2 MG: 2 INJECTION, SOLUTION INTRAMUSCULAR; INTRAVENOUS at 00:20

## 2018-07-16 RX ADMIN — LORAZEPAM 1 MG: 1 TABLET ORAL at 23:45

## 2018-07-16 RX ADMIN — IOPAMIDOL 70 ML: 612 INJECTION, SOLUTION INTRAVENOUS at 18:58

## 2018-07-16 RX ADMIN — HEPARIN SODIUM AND DEXTROSE 300 UNITS/HR: 5000; 5 INJECTION INTRAVENOUS at 19:39

## 2018-07-16 RX ADMIN — SODIUM CHLORIDE 100 ML/HR: 900 INJECTION, SOLUTION INTRAVENOUS at 19:58

## 2018-07-16 RX ADMIN — MIDAZOLAM HYDROCHLORIDE 1 MG: 1 INJECTION, SOLUTION INTRAMUSCULAR; INTRAVENOUS at 18:05

## 2018-07-16 RX ADMIN — Medication 10 ML: at 05:34

## 2018-07-16 RX ADMIN — FENTANYL CITRATE 25 MCG: 50 INJECTION, SOLUTION INTRAMUSCULAR; INTRAVENOUS at 18:30

## 2018-07-16 RX ADMIN — MORPHINE SULFATE 2 MG: 2 INJECTION, SOLUTION INTRAMUSCULAR; INTRAVENOUS at 05:32

## 2018-07-16 RX ADMIN — HYDROCODONE BITARTRATE AND ACETAMINOPHEN 1 TABLET: 7.5; 325 TABLET ORAL at 20:54

## 2018-07-16 RX ADMIN — SODIUM BICARBONATE 2 ML: 0.2 INJECTION, SOLUTION INTRAVENOUS at 18:26

## 2018-07-16 RX ADMIN — DIPHENHYDRAMINE HYDROCHLORIDE 50 MG: 50 INJECTION, SOLUTION INTRAMUSCULAR; INTRAVENOUS at 18:00

## 2018-07-16 RX ADMIN — MORPHINE SULFATE 2 MG: 2 INJECTION, SOLUTION INTRAMUSCULAR; INTRAVENOUS at 14:25

## 2018-07-16 RX ADMIN — HEPARIN SODIUM 2550 UNITS: 5000 INJECTION, SOLUTION INTRAVENOUS; SUBCUTANEOUS at 07:40

## 2018-07-16 NOTE — PROGRESS NOTES
IR Nurse Pre-Procedure Checklist Part 2    Patient in IR suite .  Name ,  and procedure confirmed     Consent signed: Yes    H&P complete:  Yes    Antibiotics: No    Airway/Mallampati Done: Yes    Shaved: Yes    Pregnancy Form:No    Patient Position: Yes    MD Side: Yes     Biopsy Worksheet: Not applicable    Specimen Medium: Not applicable

## 2018-07-16 NOTE — PROGRESS NOTES
Hospitalist Progress Note Admit Date:  7/15/2018  2:11 PM  
Name:  Soumya Fine Age:  50 y.o. 
:  1970 MRN:  [id number] PCP:  Angelyn Kussmaul, MD 
Treatment Team: Attending Provider: Florencia Delaney MD; Consulting Provider: David Smith MD; Care Manager: Karolyn Lyon, RN; Utilization Review: Wilfrid Fabry Subjective:  
Patient is a 46yo  AAM with PMH of Gastric perforation with abdominal surgery , recent colonoscopy with polyp removal, smokes 1/2 pack day cigarettes, and drinks 4-6 beers per day, who came into the ER with complaints of 10/10 abdominal pain, associated with nausea, vomiting, chills/sweats  x 4 days. Patient reports lower back pain and BM this morning. Denies diarrhea, CP, SOB, HA, or fever. CT findings of large thrombus within the IVC, possibly extending into iliac veins. Vascular and IR contacted by ER. Heparin drip started. UA positive and rocephin started. WBC 4.3. Lipase 34. BP  151/101. Patient admitted to IP remote telemetry. Patient had doppler BLE was negative. Patient to IR for transcath thrombolysis. Patient is to be transferred to ICU post procedure. Objective:  
Patient Vitals for the past 24 hrs: 
 Temp Pulse Resp BP SpO2  
18 1456 98.2 °F (36.8 °C) (!) 49 18 126/79 98 % 18 0814 97.6 °F (36.4 °C) (!) 46 18 114/73 96 % 18 0412 98.1 °F (36.7 °C) (!) 56 18 119/88 98 % 18 0029 98.1 °F (36.7 °C) (!) 53 18 122/83 95 % 07/15/18 2000 98.3 °F (36.8 °C) (!) 51 18 131/88 98 % Oxygen Therapy O2 Sat (%): 98 % (18 1456) Pulse via Oximetry: 61 beats per minute (07/15/18 1429) O2 Device: Room air (18 1456) No intake or output data in the 24 hours ending 18 1511 General:    Well nourished. Alert. CV:   RRR. No murmur, rub, or gallop. Lungs:   CTAB. No wheezing, rhonchi, or rales. Abdomen:   Soft, nontender, nondistended. Extremities: Warm and dry. No cyanosis or edema.   
Skin:     No rashes or jaundice. Data Review: 
I have reviewed all labs, meds, telemetry events, and studies from the last 24 hours. Recent Results (from the past 24 hour(s)) URINE MICROSCOPIC Collection Time: 07/15/18  4:35 PM  
Result Value Ref Range WBC 5-10 0 /hpf  
 RBC 0-3 0 /hpf Epithelial cells 0-3 0 /hpf Bacteria 4+ (H) 0 /hpf Casts 0-3 0 /lpf PTT Collection Time: 07/15/18 11:26 PM  
Result Value Ref Range aPTT 83.6 (H) 23.2 - 35.3 SEC METABOLIC PANEL, BASIC Collection Time: 07/16/18  5:44 AM  
Result Value Ref Range Sodium 141 136 - 145 mmol/L Potassium 3.5 3.5 - 5.1 mmol/L Chloride 104 98 - 107 mmol/L  
 CO2 28 21 - 32 mmol/L Anion gap 9 7 - 16 mmol/L Glucose 82 65 - 100 mg/dL BUN 10 6 - 23 MG/DL Creatinine 1.04 0.8 - 1.5 MG/DL  
 GFR est AA >60 >60 ml/min/1.73m2 GFR est non-AA >60 >60 ml/min/1.73m2 Calcium 8.6 8.3 - 10.4 MG/DL  
CBC W/O DIFF Collection Time: 07/16/18  5:44 AM  
Result Value Ref Range WBC 4.8 4.3 - 11.1 K/uL  
 RBC 4.18 (L) 4.23 - 5.67 M/uL  
 HGB 13.8 13.6 - 17.2 g/dL HCT 41.6 41.1 - 50.3 % MCV 99.5 (H) 79.6 - 97.8 FL  
 MCH 33.0 (H) 26.1 - 32.9 PG  
 MCHC 33.2 31.4 - 35.0 g/dL  
 RDW 12.9 11.9 - 14.6 % PLATELET 216 735 - 477 K/uL MPV 9.6 (L) 10.8 - 14.1 FL  
PTT Collection Time: 07/16/18  5:44 AM  
Result Value Ref Range aPTT 64.9 (H) 23.2 - 35.3 SEC All Micro Results None Current Meds: 
Current Facility-Administered Medications Medication Dose Route Frequency  heparin 25,000 units in dextrose 500 mL infusion  18-36 Units/kg/hr IntraVENous TITRATE  sodium chloride (NS) flush 5-10 mL  5-10 mL IntraVENous Q8H  
 sodium chloride (NS) flush 5-10 mL  5-10 mL IntraVENous PRN  
 naloxone (NARCAN) injection 0.4 mg  0.4 mg IntraVENous PRN  
 morphine injection 2 mg  2 mg IntraVENous Q3H PRN  
 ondansetron (ZOFRAN) injection 4 mg  4 mg IntraVENous Q4H PRN  
 0.9% sodium chloride infusion 100 mL/hr IntraVENous CONTINUOUS  
 LORazepam (ATIVAN) tablet 1 mg  1 mg Oral Q6H PRN  
 nicotine (NICODERM CQ) 14 mg/24 hr patch 1 Patch  1 Patch TransDERmal DAILY  enalaprilat (VASOTEC) injection 1.25 mg  1.25 mg IntraVENous Q6H Other Studies (last 24 hours): 
Ct Abd Pelv W Cont Result Date: 7/15/2018 CT ABDOMEN AND PELVIS WITH IV CONTRAST HISTORY:  Abdominal pain and burning. COMPARISON:  CT abdomen and pelvis October 3, 5352 TECHNIQUE:  Helical scans through the abdomen and pelvis with oral and IV contrast, Isovue-370, 100 cc  IV to improve conspicuity of infection and neoplasia. Radiation dose reduction techniques were used for this study: All CT scans performed at this facility use one or all of the following: Automated exposure control, adjustment of the mA and/or kVp according to patient's size, iterative reconstruction. CT ABDOMEN:  Bowel loops are not dilated. No abnormal extraluminal gas or fluid. Kidneys demonstrate normal enhancement and negative for evidence of urinary obstruction. Large right lobe and smaller left lobe lesions with peripheral noncontiguous enhancement most consistent with hemangiomas, unchanged. No new liver lesions. IVC normal caliber. There is well-defined filling defect in the IVC extending from the renal vein level caudally and into the left common iliac vein. Unfortunately the iliac veins and femoral veins are not opacified, probably related to bolus timing. CT PELVIS:  No free fluid in the pelvis. No mass or lymphadenopathy. IMPRESSION:  1. Normal bowel gas pattern. 2. Large thrombus within the IVC, possibly extending into the iliac veins. 3. No change liver hemangiomas. [facility] The critical results contained in this report were communicated to the ED physician, Dr. Trinity Schulz by myself, Dr. Eagle Naqvi on 7/15/2018 at 1650 hours.  Critical results were communicated as outlined in Section II.C.2.a.i of the ACR Practice Guideline for Communication of Diagnostic Imaging Findings. Duplex Lower Ext Venous Bilat Result Date: 7/16/2018 BILATERAL LOWER EXTREMITY VENOUS DUPLEX ULTRASOUND. HISTORY:  Pain. TECHNIQUE: Direct skin-contact high resolution grayscale images, color-flow Doppler and duplex waveform analysis. FINDINGS: There is compressibility, color-flow assignment and augmentation of the venous waveform with calf compression in the common femoral, superficial femoral and popliteal veins. Upper calf veins are unremarkable. Thrombus is identified in the mid inferior vena cava. IMPRESSION: Negative for sonographic evidence of deep venous thrombosis bilateral lower extremity. Thrombus is identified in the mid inferior vena cava. Assessment and Plan:  
 
Hospital Problems as of 7/16/2018  Date Reviewed: 10/3/2014 Codes Class Noted - Resolved POA Abdominal pain ICD-10-CM: R10.9 ICD-9-CM: 789.00  7/15/2018 - Present Unknown PLAN:   
 
IVC thrombus 
-admit to IP with remote telemetry 
-Heparin gtt 
-Consult Vascular 
-Consult IR 
-Heme consult 
-IR transcath thrombolysis 7/16 Abdominal pain 
-PRN pain medication 
-PRN nausea medication UTI 
-Rocephin 
-Follow culture UDS PRN vasotec for hypertension Monitor for ETOH withdrawal 
PRN ativan for withdrawal symptoms Smoking cessation education Nicoderm patch DC planning/Dispo:  home DVT ppx:  Heparin drip Plan of care discussed with Dr. Kishan Rahman, Signed: 
Herbert Nava NP

## 2018-07-16 NOTE — PROGRESS NOTES
07/16/18 0715   Dual Skin Pressure Injury Assessment   Dual Skin Pressure Injury Assessment WDL  (edema noted to rt forearm .  pt states from Er blood draw.)   Second Care Provider (Based on [address] [place] [address]) Eva Patten RN

## 2018-07-16 NOTE — PROGRESS NOTES
I  TRANSFER - OUT REPORT:    Verbal report given to sanjuana Starkey on Maribell Allred  being transferred to [facility](unit) for routine post - op       Report consisted of patients Situation, Background, Assessment and   Recommendations(SBAR). Information from the following report(s) SBAR, Procedure Summary and MAR was reviewed with the receiving nurse. Opportunity for questions and clarification was provided. Conscious Sedation:   100 Mcg of Fentanyl administered  2 Mg of Versed administered  50 mg of benadryl administered . Pt tolerated procedure well. Visit Vitals    /85    Pulse (!) 43    Temp 98.2 °F (36.8 °C)    Resp 12    Ht 5' 8\" (1.727 m)    Wt 72.6 kg (160 lb)    SpO2 100%    BMI 24.33 kg/m2     Past Medical History:   Diagnosis Date    Chronic kidney disease      Peripheral IV Right Antecubital (Active)   Site Assessment Clean, dry, & intact 7/16/2018  6:44 AM   Phlebitis Assessment 0 7/16/2018  6:44 AM   Infiltration Assessment 0 7/16/2018  6:44 AM   Dressing Status Clean, dry, & intact 7/16/2018  6:44 AM   Dressing Type Tape;Transparent 7/16/2018  6:44 AM   Hub Color/Line Status Green; Infusing;Patent 7/16/2018  6:44 AM   Alcohol Cap Used No 7/16/2018  6:44 AM       Peripheral IV 07/16/18 Left Forearm (Active)           Sheath 07/16/18 (Active)

## 2018-07-16 NOTE — PROGRESS NOTES
Pt had small pinpoint area to Rt arm between rt forearm iv site and slightly above rt ac area iv site. with some edema noted  To area. Pt stated where they had stuck him in er for blood.

## 2018-07-16 NOTE — PROGRESS NOTES
's initial visit. Mr. Latisha Chapman was off the floor upon my arrival and I spoke with is girlfriend Julia Rosas. Assisted Julia Rosas with a meal tray at her request via Patient Relations. No spiritual/emotional concerns were voiced. Provided 's card for future reference/assistance.      Danisha Castillo 68  Board Certified

## 2018-07-16 NOTE — PROGRESS NOTES
Went into room to give report to Phan. Pt c/o pain to rt arm. Rt arm area between iv sites to rt arm with more edema noted to previous site that had edema on arrival to floor. 20 rodrigo iv below site dc'd . Heparin infusion connected to upper 20 g site. 2x2's applied to old iv site no bleeding noted at old iv site.

## 2018-07-16 NOTE — PROGRESS NOTES
Problem: Falls - Risk of  Goal: *Absence of Falls  Document Alyson Fall Risk and appropriate interventions in the flowsheet.    Outcome: Progressing Towards Goal  Fall Risk Interventions:            Medication Interventions: Patient to call before getting OOB, Teach patient to arise slowly

## 2018-07-16 NOTE — PROGRESS NOTES
Met with patient at bedside. Patient is alert and oriented. Patient currently lives with a friend. Home has 1 step to enter. Prior to admission, patient was independent with adls, currently does not drive. Sister transports as needed. Currently has no DME at home. Patient states he does not have insurance at this time, gets most of his medications at [facility]. Patient PCP is Dr. Kaylee Lewis at [facility]. Follow up appointment made for July 26th @ 8:20am. Patient states discharge plan is to return home. No needs voiced at this time. CM will continue to follow for discharge needs. Care Management Interventions  PCP Verified by CM: Yes  Mode of Transport at Discharge:  Other (see comment)  Transition of Care Consult (CM Consult): Discharge Planning  Discharge Durable Medical Equipment: No  Physical Therapy Consult: No  Occupational Therapy Consult: No  Current Support Network: Own Home (lives with roommate)  Confirm Follow Up Transport: Self  Plan discussed with Pt/Family/Caregiver: Yes  Freedom of Choice Offered: Yes  Discharge Location  Discharge Placement: Home

## 2018-07-16 NOTE — PROCEDURES
Interventional Radiology Brief Procedure Note    Patient: Billy Dick MRN: [id number]  SSN: [id number]    YOB: 1970  Age: 50 y.o. Sex: male      Date of Procedure: 7/16/2018    Pre-Procedure Diagnosis: IVC thrombus. Weight loss. Occult malignancy suspected. EtOH use. Large hepatic hemangiomas. Post-Procedure Diagnosis: SAME    Procedure(s): Venogram    Brief Description of Procedure: L IIV venogram.  IVCogram.  Supra-renal IVC Filter placement. EKOS infusion catheter placement into the IVC. Performed By: Carmen Ruelas MD     Assistants: None    Anesthesia: Moderate Sedation    Estimated Blood Loss: Less than 10ml    Specimens: None    Implants: None    Findings:  Sausage-like thrombus in the infra-renal IVC. Complications: None    Recommendations: Transfer to ICU. Heparin 300 U/hr thru PIV. tPA at 1 mg/hr thru EKOS catheter. Bedrest.  NPO p MN. Follow Up: IVCogram tomorrow.       Signed By: Carmen Ruelas MD     July 16, 2018

## 2018-07-16 NOTE — PROGRESS NOTES
Interdisciplinary Rounds completed 07/16/18. Nursing, Case Management, Physician and PT present. Plan of care reviewed and updated.   Pt to transfer to ICU

## 2018-07-17 ENCOUNTER — APPOINTMENT (OUTPATIENT)
Dept: INTERVENTIONAL RADIOLOGY/VASCULAR | Age: 48
DRG: 271 | End: 2018-07-17
Attending: INTERNAL MEDICINE
Payer: SELF-PAY

## 2018-07-17 PROBLEM — R63.4 EXCESSIVE WEIGHT LOSS: Status: ACTIVE | Noted: 2018-07-17

## 2018-07-17 PROBLEM — D18.03 HEMANGIOMA OF LIVER: Status: ACTIVE | Noted: 2018-07-17

## 2018-07-17 PROBLEM — R00.1 BRADYCARDIA, SINUS: Status: ACTIVE | Noted: 2018-07-17

## 2018-07-17 PROBLEM — I82.220 IVC THROMBOSIS (HCC): Status: ACTIVE | Noted: 2018-07-17

## 2018-07-17 LAB
ANION GAP SERPL CALC-SCNC: 7 MMOL/L (ref 7–16)
APTT PPP: 36.2 SEC (ref 23.2–35.3)
APTT PPP: 41.1 SEC (ref 23.2–35.3)
ATRIAL RATE: 47 BPM
BASOPHILS # BLD: 0 K/UL (ref 0–0.2)
BASOPHILS # BLD: 0 K/UL (ref 0–0.2)
BASOPHILS NFR BLD: 0 % (ref 0–2)
BASOPHILS NFR BLD: 1 % (ref 0–2)
BUN SERPL-MCNC: 13 MG/DL (ref 6–23)
CALCIUM SERPL-MCNC: 7.9 MG/DL (ref 8.3–10.4)
CALCULATED P AXIS, ECG09: 64 DEGREES
CALCULATED R AXIS, ECG10: 71 DEGREES
CALCULATED T AXIS, ECG11: 67 DEGREES
CHLORIDE SERPL-SCNC: 108 MMOL/L (ref 98–107)
CO2 SERPL-SCNC: 29 MMOL/L (ref 21–32)
CREAT SERPL-MCNC: 0.8 MG/DL (ref 0.8–1.5)
DIAGNOSIS, 93000: NORMAL
DIFFERENTIAL METHOD BLD: ABNORMAL
DIFFERENTIAL METHOD BLD: ABNORMAL
EOSINOPHIL # BLD: 0 K/UL (ref 0–0.8)
EOSINOPHIL # BLD: 0.1 K/UL (ref 0–0.8)
EOSINOPHIL NFR BLD: 1 % (ref 0.5–7.8)
EOSINOPHIL NFR BLD: 2 % (ref 0.5–7.8)
ERYTHROCYTE [DISTWIDTH] IN BLOOD BY AUTOMATED COUNT: 12.7 % (ref 11.9–14.6)
ERYTHROCYTE [DISTWIDTH] IN BLOOD BY AUTOMATED COUNT: 12.8 % (ref 11.9–14.6)
FIBRINOGEN PPP-MCNC: 192 MG/DL (ref 190–501)
FIBRINOGEN PPP-MCNC: 263 MG/DL (ref 190–501)
GLUCOSE SERPL-MCNC: 78 MG/DL (ref 65–100)
HCT VFR BLD AUTO: 37 % (ref 41.1–50.3)
HCT VFR BLD AUTO: 38.5 % (ref 41.1–50.3)
HGB BLD-MCNC: 12.2 G/DL (ref 13.6–17.2)
HGB BLD-MCNC: 12.6 G/DL (ref 13.6–17.2)
IMM GRANULOCYTES # BLD: 0 K/UL (ref 0–0.5)
IMM GRANULOCYTES # BLD: 0 K/UL (ref 0–0.5)
IMM GRANULOCYTES NFR BLD AUTO: 0 % (ref 0–5)
IMM GRANULOCYTES NFR BLD AUTO: 0 % (ref 0–5)
INR PPP: 1.2
LYMPHOCYTES # BLD: 1.5 K/UL (ref 0.5–4.6)
LYMPHOCYTES # BLD: 2 K/UL (ref 0.5–4.6)
LYMPHOCYTES NFR BLD: 41 % (ref 13–44)
LYMPHOCYTES NFR BLD: 43 % (ref 13–44)
MCH RBC QN AUTO: 32.6 PG (ref 26.1–32.9)
MCH RBC QN AUTO: 32.7 PG (ref 26.1–32.9)
MCHC RBC AUTO-ENTMCNC: 32.7 G/DL (ref 31.4–35)
MCHC RBC AUTO-ENTMCNC: 33 G/DL (ref 31.4–35)
MCV RBC AUTO: 99.2 FL (ref 79.6–97.8)
MCV RBC AUTO: 99.7 FL (ref 79.6–97.8)
MONOCYTES # BLD: 0.5 K/UL (ref 0.1–1.3)
MONOCYTES # BLD: 0.5 K/UL (ref 0.1–1.3)
MONOCYTES NFR BLD: 10 % (ref 4–12)
MONOCYTES NFR BLD: 13 % (ref 4–12)
NEUTS SEG # BLD: 1.5 K/UL (ref 1.7–8.2)
NEUTS SEG # BLD: 2.2 K/UL (ref 1.7–8.2)
NEUTS SEG NFR BLD: 44 % (ref 43–78)
NEUTS SEG NFR BLD: 45 % (ref 43–78)
P-R INTERVAL, ECG05: 142 MS
PLATELET # BLD AUTO: 213 K/UL (ref 150–450)
PLATELET # BLD AUTO: 221 K/UL (ref 150–450)
PMV BLD AUTO: 9.6 FL (ref 10.8–14.1)
PMV BLD AUTO: 9.7 FL (ref 10.8–14.1)
POTASSIUM SERPL-SCNC: 3.8 MMOL/L (ref 3.5–5.1)
PROTHROMBIN TIME: 14.5 SEC (ref 11.5–14.5)
Q-T INTERVAL, ECG07: 472 MS
QRS DURATION, ECG06: 86 MS
QTC CALCULATION (BEZET), ECG08: 417 MS
RBC # BLD AUTO: 3.73 M/UL (ref 4.23–5.67)
RBC # BLD AUTO: 3.86 M/UL (ref 4.23–5.67)
SODIUM SERPL-SCNC: 144 MMOL/L (ref 136–145)
VENTRICULAR RATE, ECG03: 47 BPM
WBC # BLD AUTO: 3.5 K/UL (ref 4.3–11.1)
WBC # BLD AUTO: 4.8 K/UL (ref 4.3–11.1)

## 2018-07-17 PROCEDURE — 74011000250 HC RX REV CODE- 250: Performed by: INTERNAL MEDICINE

## 2018-07-17 PROCEDURE — 65610000001 HC ROOM ICU GENERAL

## 2018-07-17 PROCEDURE — 74011250637 HC RX REV CODE- 250/637: Performed by: RADIOLOGY

## 2018-07-17 PROCEDURE — 85730 THROMBOPLASTIN TIME PARTIAL: CPT | Performed by: RADIOLOGY

## 2018-07-17 PROCEDURE — 80048 BASIC METABOLIC PNL TOTAL CA: CPT | Performed by: RADIOLOGY

## 2018-07-17 PROCEDURE — 74011000250 HC RX REV CODE- 250: Performed by: RADIOLOGY

## 2018-07-17 PROCEDURE — 74011250636 HC RX REV CODE- 250/636: Performed by: NURSE PRACTITIONER

## 2018-07-17 PROCEDURE — 74011250637 HC RX REV CODE- 250/637: Performed by: INTERNAL MEDICINE

## 2018-07-17 PROCEDURE — 74011636320 HC RX REV CODE- 636/320: Performed by: RADIOLOGY

## 2018-07-17 PROCEDURE — 85025 COMPLETE CBC W/AUTO DIFF WBC: CPT | Performed by: RADIOLOGY

## 2018-07-17 PROCEDURE — 06PY3DZ REMOVAL OF INTRALUMINAL DEVICE FROM LOWER VEIN, PERCUTANEOUS APPROACH: ICD-10-PCS | Performed by: RADIOLOGY

## 2018-07-17 PROCEDURE — 3E04317 INTRODUCTION OF OTHER THROMBOLYTIC INTO CENTRAL VEIN, PERCUTANEOUS APPROACH: ICD-10-PCS | Performed by: RADIOLOGY

## 2018-07-17 PROCEDURE — 85384 FIBRINOGEN ACTIVITY: CPT | Performed by: RADIOLOGY

## 2018-07-17 PROCEDURE — 37191 INS ENDOVAS VENA CAVA FILTR: CPT

## 2018-07-17 PROCEDURE — 74011250636 HC RX REV CODE- 250/636: Performed by: RADIOLOGY

## 2018-07-17 PROCEDURE — 74011250637 HC RX REV CODE- 250/637: Performed by: NURSE PRACTITIONER

## 2018-07-17 PROCEDURE — 77030004629 HC CATH ANGI SZ AUR COOK -B

## 2018-07-17 PROCEDURE — C1880 VENA CAVA FILTER: HCPCS

## 2018-07-17 PROCEDURE — 74011250636 HC RX REV CODE- 250/636

## 2018-07-17 PROCEDURE — 74011250636 HC RX REV CODE- 250/636: Performed by: INTERNAL MEDICINE

## 2018-07-17 PROCEDURE — C1769 GUIDE WIRE: HCPCS

## 2018-07-17 PROCEDURE — 85610 PROTHROMBIN TIME: CPT | Performed by: RADIOLOGY

## 2018-07-17 PROCEDURE — C1773 RET DEV, INSERTABLE: HCPCS

## 2018-07-17 PROCEDURE — B5191ZZ FLUOROSCOPY OF INFERIOR VENA CAVA USING LOW OSMOLAR CONTRAST: ICD-10-PCS | Performed by: RADIOLOGY

## 2018-07-17 PROCEDURE — 06H03DZ INSERTION OF INTRALUMINAL DEVICE INTO INFERIOR VENA CAVA, PERCUTANEOUS APPROACH: ICD-10-PCS | Performed by: RADIOLOGY

## 2018-07-17 PROCEDURE — C1887 CATHETER, GUIDING: HCPCS

## 2018-07-17 PROCEDURE — 06C03ZZ EXTIRPATION OF MATTER FROM INFERIOR VENA CAVA, PERCUTANEOUS APPROACH: ICD-10-PCS | Performed by: RADIOLOGY

## 2018-07-17 PROCEDURE — C1894 INTRO/SHEATH, NON-LASER: HCPCS

## 2018-07-17 PROCEDURE — 77030020263 HC SOL INJ SOD CL0.9% LFCR 1000ML

## 2018-07-17 PROCEDURE — B51F1ZZ FLUOROSCOPY OF RIGHT PELVIC (ILIAC) VEINS USING LOW OSMOLAR CONTRAST: ICD-10-PCS | Performed by: RADIOLOGY

## 2018-07-17 PROCEDURE — 93005 ELECTROCARDIOGRAM TRACING: CPT | Performed by: INTERNAL MEDICINE

## 2018-07-17 PROCEDURE — 74011000258 HC RX REV CODE- 258: Performed by: INTERNAL MEDICINE

## 2018-07-17 PROCEDURE — 88304 TISSUE EXAM BY PATHOLOGIST: CPT | Performed by: INTERNAL MEDICINE

## 2018-07-17 RX ORDER — HEPARIN SODIUM 5000 [USP'U]/100ML
18-36 INJECTION, SOLUTION INTRAVENOUS
Status: DISCONTINUED | OUTPATIENT
Start: 2018-07-17 | End: 2018-07-24 | Stop reason: HOSPADM

## 2018-07-17 RX ORDER — LIDOCAINE HYDROCHLORIDE 10 MG/ML
20-200 INJECTION INFILTRATION; PERINEURAL ONCE
Status: COMPLETED | OUTPATIENT
Start: 2018-07-17 | End: 2018-07-17

## 2018-07-17 RX ORDER — HEPARIN SODIUM 200 [USP'U]/100ML
3000 INJECTION, SOLUTION INTRAVENOUS AS NEEDED
Status: DISCONTINUED | OUTPATIENT
Start: 2018-07-17 | End: 2018-07-19 | Stop reason: HOSPADM

## 2018-07-17 RX ORDER — ENALAPRILAT 1.25 MG/ML
1.25 INJECTION INTRAVENOUS
Status: DISCONTINUED | OUTPATIENT
Start: 2018-07-17 | End: 2018-07-24 | Stop reason: HOSPADM

## 2018-07-17 RX ORDER — MIDAZOLAM HYDROCHLORIDE 1 MG/ML
.5-2 INJECTION, SOLUTION INTRAMUSCULAR; INTRAVENOUS
Status: DISCONTINUED | OUTPATIENT
Start: 2018-07-17 | End: 2018-07-17

## 2018-07-17 RX ORDER — SODIUM CHLORIDE 9 MG/ML
25 INJECTION, SOLUTION INTRAVENOUS ONCE
Status: COMPLETED | OUTPATIENT
Start: 2018-07-17 | End: 2018-07-17

## 2018-07-17 RX ORDER — DIPHENHYDRAMINE HYDROCHLORIDE 50 MG/ML
12.5-5 INJECTION, SOLUTION INTRAMUSCULAR; INTRAVENOUS ONCE
Status: COMPLETED | OUTPATIENT
Start: 2018-07-17 | End: 2018-07-17

## 2018-07-17 RX ORDER — FENTANYL CITRATE 50 UG/ML
25-50 INJECTION, SOLUTION INTRAMUSCULAR; INTRAVENOUS
Status: DISCONTINUED | OUTPATIENT
Start: 2018-07-17 | End: 2018-07-19 | Stop reason: HOSPADM

## 2018-07-17 RX ORDER — WARFARIN SODIUM 5 MG/1
5 TABLET ORAL EVERY EVENING
Status: DISCONTINUED | OUTPATIENT
Start: 2018-07-17 | End: 2018-07-21

## 2018-07-17 RX ORDER — OXYCODONE AND ACETAMINOPHEN 10; 325 MG/1; MG/1
1 TABLET ORAL
Status: DISCONTINUED | OUTPATIENT
Start: 2018-07-17 | End: 2018-07-24 | Stop reason: HOSPADM

## 2018-07-17 RX ADMIN — MORPHINE SULFATE 2 MG: 2 INJECTION, SOLUTION INTRAMUSCULAR; INTRAVENOUS at 02:23

## 2018-07-17 RX ADMIN — Medication 10 ML: at 21:11

## 2018-07-17 RX ADMIN — FENTANYL CITRATE 50 MCG: 50 INJECTION, SOLUTION INTRAMUSCULAR; INTRAVENOUS at 15:37

## 2018-07-17 RX ADMIN — SODIUM CHLORIDE 100 ML/HR: 900 INJECTION, SOLUTION INTRAVENOUS at 17:53

## 2018-07-17 RX ADMIN — WARFARIN SODIUM 5 MG: 5 TABLET ORAL at 17:53

## 2018-07-17 RX ADMIN — MIDAZOLAM HYDROCHLORIDE 1 MG: 1 INJECTION, SOLUTION INTRAMUSCULAR; INTRAVENOUS at 16:04

## 2018-07-17 RX ADMIN — SODIUM BICARBONATE 2 ML: 0.2 INJECTION, SOLUTION INTRAVENOUS at 16:25

## 2018-07-17 RX ADMIN — MORPHINE SULFATE 2 MG: 2 INJECTION, SOLUTION INTRAMUSCULAR; INTRAVENOUS at 14:10

## 2018-07-17 RX ADMIN — B-COMPLEX W/ C & FOLIC ACID TAB 1 TABLET: TAB at 11:53

## 2018-07-17 RX ADMIN — SODIUM CHLORIDE 25 ML/HR: 900 INJECTION, SOLUTION INTRAVENOUS at 15:07

## 2018-07-17 RX ADMIN — FENTANYL CITRATE 50 MCG: 50 INJECTION, SOLUTION INTRAMUSCULAR; INTRAVENOUS at 15:23

## 2018-07-17 RX ADMIN — MIDAZOLAM HYDROCHLORIDE 1 MG: 1 INJECTION, SOLUTION INTRAMUSCULAR; INTRAVENOUS at 15:17

## 2018-07-17 RX ADMIN — HEPARIN SODIUM 4000 UNITS: 200 INJECTION, SOLUTION INTRAVENOUS at 16:17

## 2018-07-17 RX ADMIN — MORPHINE SULFATE 2 MG: 2 INJECTION, SOLUTION INTRAMUSCULAR; INTRAVENOUS at 21:08

## 2018-07-17 RX ADMIN — LIDOCAINE HYDROCHLORIDE 8 ML: 10 INJECTION, SOLUTION INFILTRATION; PERINEURAL at 16:24

## 2018-07-17 RX ADMIN — FOLIC ACID: 5 INJECTION, SOLUTION INTRAMUSCULAR; INTRAVENOUS; SUBCUTANEOUS at 11:52

## 2018-07-17 RX ADMIN — MIDAZOLAM HYDROCHLORIDE 1 MG: 1 INJECTION, SOLUTION INTRAMUSCULAR; INTRAVENOUS at 15:24

## 2018-07-17 RX ADMIN — IOPAMIDOL 140 ML: 612 INJECTION, SOLUTION INTRAVENOUS at 16:13

## 2018-07-17 RX ADMIN — Medication 10 ML: at 05:24

## 2018-07-17 RX ADMIN — FENTANYL CITRATE 50 MCG: 50 INJECTION, SOLUTION INTRAMUSCULAR; INTRAVENOUS at 15:17

## 2018-07-17 RX ADMIN — MIDAZOLAM HYDROCHLORIDE 1 MG: 1 INJECTION, SOLUTION INTRAMUSCULAR; INTRAVENOUS at 15:43

## 2018-07-17 RX ADMIN — MORPHINE SULFATE 2 MG: 2 INJECTION, SOLUTION INTRAMUSCULAR; INTRAVENOUS at 09:24

## 2018-07-17 RX ADMIN — Medication 10 ML: at 14:12

## 2018-07-17 RX ADMIN — LORAZEPAM 1 MG: 1 TABLET ORAL at 20:58

## 2018-07-17 RX ADMIN — MORPHINE SULFATE 2 MG: 2 INJECTION, SOLUTION INTRAMUSCULAR; INTRAVENOUS at 05:23

## 2018-07-17 RX ADMIN — DIPHENHYDRAMINE HYDROCHLORIDE 50 MG: 50 INJECTION, SOLUTION INTRAMUSCULAR; INTRAVENOUS at 15:08

## 2018-07-17 RX ADMIN — HEPARIN SODIUM AND DEXTROSE 18 UNITS/KG/HR: 5000; 5 INJECTION INTRAVENOUS at 20:47

## 2018-07-17 RX ADMIN — OXYCODONE HYDROCHLORIDE AND ACETAMINOPHEN 1 TABLET: 10; 325 TABLET ORAL at 18:58

## 2018-07-17 NOTE — PROGRESS NOTES
Bedside shift change report given to GIOVANNI Mayberry (oncoming nurse) by Lui Whyte RN (offgoing nurse). Report included the following information SBAR, Kardex, ED Summary, Procedure Summary, Intake/Output, MAR, Recent Results and Cardiac Rhythm SB/SR.

## 2018-07-17 NOTE — PROGRESS NOTES
TRANSFER - OUT REPORT:    Verbal report given to Brad Velazquez RN (name) on Shae   being transferred to ICU (unit) for routine post - op       Report consisted of patients Situation, Background, Assessment and   Recommendations(SBAR). Information from the following report(s) SBAR, Kardex, Procedure Summary and MAR was reviewed with the receiving nurse. Lines:   Peripheral IV Right Antecubital (Active)   Site Assessment Clean, dry, & intact 7/17/2018 12:00 PM   Phlebitis Assessment 0 7/17/2018 12:00 PM   Infiltration Assessment 0 7/17/2018 12:00 PM   Dressing Status Clean, dry, & intact 7/17/2018 12:00 PM   Dressing Type Tape;Transparent 7/17/2018 12:00 PM   Hub Color/Line Status Pink;Patent; Infusing 7/17/2018 12:00 PM   Alcohol Cap Used No 7/17/2018 12:00 PM       Peripheral IV 07/16/18 Left Forearm (Active)   Site Assessment Clean, dry, & intact 7/17/2018 12:00 PM   Phlebitis Assessment 0 7/17/2018 12:00 PM   Infiltration Assessment 0 7/17/2018 12:00 PM   Dressing Status Clean, dry, & intact 7/17/2018 12:00 PM   Dressing Type Tape;Transparent 7/17/2018 12:00 PM   Hub Color/Line Status Pink;Patent; Flushed 7/17/2018 12:00 PM   Alcohol Cap Used No 7/17/2018 12:00 PM        Opportunity for questions and clarification was provided.       Patient transported with:   Monitor  Registered Nurse

## 2018-07-17 NOTE — PROGRESS NOTES
Bedside shift change report given to Juan Tucker RN (oncoming nurse) by Mary Arora RN (offgoing nurse).  Report included the following information SBAR, Kardex, ED Summary, Procedure Summary, Intake/Output, MAR, Recent Results and Cardiac Rhythm SB.

## 2018-07-17 NOTE — PROGRESS NOTES
TRANSFER - IN REPORT:    Verbal report received from GIOVANNI Boo on Cecil Mosley  being received from IR for routine progression of care      Report consisted of patients Situation, Background, Assessment and   Recommendations(SBAR). Information from the following report(s) SBAR, Kardex, Procedure Summary, MAR and Cardiac Rhythm SB was reviewed with the receiving nurse. Opportunity for questions and clarification was provided. Assessment completed upon patients arrival to unit and care assumed. Pt drowsy but oriented x4; VSS on RA. Pt attached to EKOS. tPA and Heparin gtts dual verified with GIOVANNI Boo. Dual skin assessment completed with GIOVANNI Duenas; skin intact. Pt's wife, Bozena Bolivar, brought to bedside and updated on pt condition. Will continue to monitor.

## 2018-07-17 NOTE — PROGRESS NOTES
Chart reviewed after transfer to ICU s/p post IR procedure. Seen and screened by [facility] prior to transfer. D/C POC is home, no needs voiced at present. CM to follow and assist with any needs.      Referral was sent to [facility] for self pay status and assist.

## 2018-07-17 NOTE — PROGRESS NOTES
LEAPFROG PROTOCOL NOTE Bernardo Hou 7/16/2018 The patient is currently in the critical care setting managed by Dr. Braeden Tierney with a vascular occlusion requiring EKOS. The patient's chart is reviewed and the patient is discussed with the staff. Patient is currently hemodynamically stable. Patient has no needs identified for Intensivist management in the critical care setting at this time. Please notify us if can be of assistance. No charge billed to the patient. Thank you.  
 
Ciara Aguiar MD

## 2018-07-17 NOTE — PROGRESS NOTES
Bedside and Verbal shift change report given to Shawanda by [address]. Report included the following information SBAR, Kardex, ED Summary, Procedure Summary, Intake/Output, MAR, Recent Results, Med Rec Status and Cardiac Rhythm NSR.

## 2018-07-17 NOTE — PROCEDURES
Interventional Radiology Brief Procedure Note    Patient: Eugenia De Dios MRN: [id number]  SSN: [id number]    YOB: 1970  Age: 50 y.o. Sex: male      Date of Procedure: 7/17/2018    Pre-Procedure Diagnosis: IVC thrombus. Post-Procedure Diagnosis: SAME    Procedure(s): IVC Venogram.  Suction thrombectomy. Supra-renal IVC Filter retrieval and replacement into the infra-renal IVC    Brief Description of Procedure: as above    Performed By: Benedict Daly MD     Assistants: None    Anesthesia: Moderate Sedation    Estimated Blood Loss: 150 ml for thrombectomy    Specimens: IVC clot/filling defect to Pathology. Implants: See Above    Findings: Some reduction in thrombus volume following tPA. More thrombus removed with thrombectomy today. No thrombus dislodged into the IVCF. Complications: None    Recommendations: 1 hour bedrest w HOB elevated. Resume heparin qtt at 22:00 tonight. Anticipate conversion to Coumadin for outpatient anticoagulation. Check pathology. Await Oncology recommendation concerning malignancy work up in patient with weight loss and (presumed) hypercoaguable state (of malignancy).        Follow Up: Office visit in 2 months to assess need for IVCF retrieval.      Signed By: Benedict Daly MD     July 17, 2018

## 2018-07-17 NOTE — PROGRESS NOTES
A follow up visit was made to the patient. Emotional support, spiritual presence and   prayer were provided. The patient has support from his girl friend, Ileana Stewart.       [employer]

## 2018-07-17 NOTE — PROGRESS NOTES
Warfarin dosing per pharmacist    Lawyer Olea is a 50 y.o. male. Height: 5' 8\" (172.7 cm)    Weight: 72.3 kg (159 lb 6.3 oz)    Indication:  IVC thrombus    Goal INR:  2-3    Home dose:  New start    Risk factors or significant drug interactions:  none    Other anticoagulants:  Heparin drip    Daily Monitoring  Date  INR     Warfarin dose HGB              Notes  7/17  1.2  5 mg  12.2    Pharmacy consulted to start warfarin for IVC thrombus. S/p IVC Venogram.  Suction thrombectomy. Supra-renal IVC Filter retrieval and replacement into the infra-renal IVC      Plan to start with 5 mg daily. Will follow daily INR for changes. Thank you,  Donal Crenshaw, Pharm. D.   Clinical Pharmacist  [phone]

## 2018-07-17 NOTE — INTERDISCIPLINARY ROUNDS
Interdisciplinary team rounds were held 7/17/2018 with the following team members:Care Management, Nursing, Nurse Practitioner, Nutrition, Palliative Care, Pharmacy, Physical Therapy, Physician, Respiratory Therapy and Clinical Coordinator and the patient. Plan of care discussed. See clinical pathway and/or care plan for interventions and desired outcomes.

## 2018-07-17 NOTE — PROGRESS NOTES
Hospitalist Progress Note    2018  Admit Date: 7/15/2018  2:11 PM   NAME: Soumya Fine   :  1970   MRN:  [id number]   Attending: Florencia Delaney MD  PCP:  Angelyn Kussmaul, MD    SUBJECTIVE:   46yo AAM with PMH of Gastric Perforation s/p abdominal surgery , tobacco and Etoh dependence, Liver Hemangiomas, Recent Wt loss presented with abd pain, N+V and 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 Seen by IR and s/p SVC filter and EKOS , transferred to ICU.     : Feels better, no bleeding, BP on lower side, Has been bradycardic but no CP or dizziness. Nursing notes and chart reviewed. Review of Systems negative with exception of pertinent positives noted above. PHYSICAL EXAM     Visit Vitals    BP (!) 133/96    Pulse (!) 54    Temp 98.2 °F (36.8 °C)    Resp 21    Ht 5' 8\" (1.727 m)    Wt 72.3 kg (159 lb 6.3 oz)    SpO2 95%    BMI 24.24 kg/m2      Temp (24hrs), Av °F (36.7 °C), Min:97.6 °F (36.4 °C), Max:98.5 °F (36.9 °C)    Oxygen Therapy  O2 Sat (%): 95 % (18)  Pulse via Oximetry: 60 beats per minute (18 1715)  O2 Device: CO2 nasal cannula (18)  O2 Flow Rate (L/min): 2 l/min (18 165)  ETCO2 (mmHg): 40 mmHg (18 1503)    Intake/Output Summary (Last 24 hours) at 18 1742  Last data filed at 18 1724   Gross per 24 hour   Intake           1492.6 ml   Output             2050 ml   Net           -557.4 ml         General: No acute distress. Alert.    Head:  AT/NC  Lungs:  CTABL. Heart:  RRR, no murmur, rub, or gallop  Abdomen: Soft, non-distended, non-tender, +bs  Extremities: No cyanosis or clubbing. Neurologic:  No focal deficits. Moves all extremities. Skin:  No Obvious Rash  Psych:  Normal affect. LABS AND STUDIES:  Personally reviewed all labs, meds, and studies for past 24hrs.       ASSESSMENT      Active Hospital Problems    Diagnosis Date Noted    [facility]    Hemangioma of liver 07/17/2018    Excessive weight loss 07/17/2018    Bradycardia, sinus 07/17/2018    Abdominal pain 07/15/2018           PLAN:    · IVC Thrombus: Appreciate IR input s/p SVC Filter and EKOS, repeat IVCogram today. · Monitor closely on tPA and Hep gtt  · Heme consult for Thrombosis and suspected Malignancy given Wt loss and suspicious polyp removed recently. · On nicotine patch  · Bradycardia: Asymptomatic, EKG is sinus anay, watch on tele. · Will IV Folate/thiamine/MVT, DT precautions. · Monitor HR, persistent Shanda Citronelle, get EKG. Dispo: Monitor closely    DVT ppx: On hep gtt  High Risk pt. Discussed plan with pt who is in agreement. All questions answered.     Signed By: Yan Ventura MD     July 17, 2018

## 2018-07-18 ENCOUNTER — APPOINTMENT (OUTPATIENT)
Dept: CT IMAGING | Age: 48
DRG: 271 | End: 2018-07-18
Attending: NURSE PRACTITIONER
Payer: SELF-PAY

## 2018-07-18 LAB
ANION GAP SERPL CALC-SCNC: 8 MMOL/L (ref 7–16)
APTT PPP: 111.9 SEC (ref 23.2–35.3)
APTT PPP: 94 SEC (ref 23.2–35.3)
APTT PPP: 99.2 SEC (ref 23.2–35.3)
BUN SERPL-MCNC: 12 MG/DL (ref 6–23)
CALCIUM SERPL-MCNC: 7.5 MG/DL (ref 8.3–10.4)
CHLORIDE SERPL-SCNC: 109 MMOL/L (ref 98–107)
CO2 SERPL-SCNC: 27 MMOL/L (ref 21–32)
CREAT SERPL-MCNC: 0.98 MG/DL (ref 0.8–1.5)
ERYTHROCYTE [DISTWIDTH] IN BLOOD BY AUTOMATED COUNT: 12.6 % (ref 11.9–14.6)
ERYTHROCYTE [SEDIMENTATION RATE] IN BLOOD: 7 MM/HR (ref 0–15)
GLUCOSE SERPL-MCNC: 87 MG/DL (ref 65–100)
HCT VFR BLD AUTO: 37 % (ref 41.1–50.3)
HGB BLD-MCNC: 12 G/DL (ref 13.6–17.2)
INR PPP: 1.2
MCH RBC QN AUTO: 33 PG (ref 26.1–32.9)
MCHC RBC AUTO-ENTMCNC: 32.4 G/DL (ref 31.4–35)
MCV RBC AUTO: 101.6 FL (ref 79.6–97.8)
PLATELET # BLD AUTO: 197 K/UL (ref 150–450)
PMV BLD AUTO: 9.4 FL (ref 10.8–14.1)
POTASSIUM SERPL-SCNC: 4.2 MMOL/L (ref 3.5–5.1)
PROTHROMBIN TIME: 14.2 SEC (ref 11.5–14.5)
PSA SERPL-MCNC: 0.2 NG/ML
RBC # BLD AUTO: 3.64 M/UL (ref 4.23–5.67)
SODIUM SERPL-SCNC: 144 MMOL/L (ref 136–145)
WBC # BLD AUTO: 5.9 K/UL (ref 4.3–11.1)

## 2018-07-18 PROCEDURE — 85306 CLOT INHIBIT PROT S FREE: CPT | Performed by: NURSE PRACTITIONER

## 2018-07-18 PROCEDURE — 81241 F5 GENE: CPT | Performed by: NURSE PRACTITIONER

## 2018-07-18 PROCEDURE — 85300 ANTITHROMBIN III ACTIVITY: CPT | Performed by: NURSE PRACTITIONER

## 2018-07-18 PROCEDURE — 74011636320 HC RX REV CODE- 636/320: Performed by: INTERNAL MEDICINE

## 2018-07-18 PROCEDURE — 73060999999 HC MISC LAB CHARGE

## 2018-07-18 PROCEDURE — 84153 ASSAY OF PSA TOTAL: CPT | Performed by: NURSE PRACTITIONER

## 2018-07-18 PROCEDURE — 88185 FLOWCYTOMETRY/TC ADD-ON: CPT | Performed by: NURSE PRACTITIONER

## 2018-07-18 PROCEDURE — 74011250636 HC RX REV CODE- 250/636: Performed by: RADIOLOGY

## 2018-07-18 PROCEDURE — 85303 CLOT INHIBIT PROT C ACTIVITY: CPT | Performed by: NURSE PRACTITIONER

## 2018-07-18 PROCEDURE — 65270000029 HC RM PRIVATE

## 2018-07-18 PROCEDURE — 71260 CT THORAX DX C+: CPT

## 2018-07-18 PROCEDURE — 74011250637 HC RX REV CODE- 250/637: Performed by: RADIOLOGY

## 2018-07-18 PROCEDURE — 74011250636 HC RX REV CODE- 250/636: Performed by: NURSE PRACTITIONER

## 2018-07-18 PROCEDURE — 85027 COMPLETE CBC AUTOMATED: CPT | Performed by: RADIOLOGY

## 2018-07-18 PROCEDURE — 85610 PROTHROMBIN TIME: CPT | Performed by: RADIOLOGY

## 2018-07-18 PROCEDURE — 81270 JAK2 GENE: CPT

## 2018-07-18 PROCEDURE — 85730 THROMBOPLASTIN TIME PARTIAL: CPT | Performed by: INTERNAL MEDICINE

## 2018-07-18 PROCEDURE — 77030020263 HC SOL INJ SOD CL0.9% LFCR 1000ML

## 2018-07-18 PROCEDURE — 74011000258 HC RX REV CODE- 258: Performed by: INTERNAL MEDICINE

## 2018-07-18 PROCEDURE — 86146 BETA-2 GLYCOPROTEIN ANTIBODY: CPT | Performed by: NURSE PRACTITIONER

## 2018-07-18 PROCEDURE — 85610 PROTHROMBIN TIME: CPT | Performed by: NURSE PRACTITIONER

## 2018-07-18 PROCEDURE — 81240 F2 GENE: CPT | Performed by: NURSE PRACTITIONER

## 2018-07-18 PROCEDURE — 86147 CARDIOLIPIN ANTIBODY EA IG: CPT | Performed by: NURSE PRACTITIONER

## 2018-07-18 PROCEDURE — 36415 COLL VENOUS BLD VENIPUNCTURE: CPT | Performed by: RADIOLOGY

## 2018-07-18 PROCEDURE — 74011250637 HC RX REV CODE- 250/637: Performed by: INTERNAL MEDICINE

## 2018-07-18 PROCEDURE — 99223 1ST HOSP IP/OBS HIGH 75: CPT | Performed by: INTERNAL MEDICINE

## 2018-07-18 PROCEDURE — 81450 HL NEO GSAP 5-50DNA/DNA&RNA: CPT

## 2018-07-18 PROCEDURE — 85301 ANTITHROMBIN III ANTIGEN: CPT | Performed by: NURSE PRACTITIONER

## 2018-07-18 PROCEDURE — 74011000250 HC RX REV CODE- 250: Performed by: INTERNAL MEDICINE

## 2018-07-18 PROCEDURE — 74011250636 HC RX REV CODE- 250/636: Performed by: INTERNAL MEDICINE

## 2018-07-18 PROCEDURE — 80048 BASIC METABOLIC PNL TOTAL CA: CPT | Performed by: RADIOLOGY

## 2018-07-18 PROCEDURE — 85652 RBC SED RATE AUTOMATED: CPT | Performed by: NURSE PRACTITIONER

## 2018-07-18 PROCEDURE — 74011250637 HC RX REV CODE- 250/637: Performed by: NURSE PRACTITIONER

## 2018-07-18 RX ORDER — SODIUM CHLORIDE 0.9 % (FLUSH) 0.9 %
10 SYRINGE (ML) INJECTION
Status: COMPLETED | OUTPATIENT
Start: 2018-07-18 | End: 2018-07-18

## 2018-07-18 RX ORDER — LANOLIN ALCOHOL/MO/W.PET/CERES
100 CREAM (GRAM) TOPICAL DAILY
Status: DISCONTINUED | OUTPATIENT
Start: 2018-07-19 | End: 2018-07-24 | Stop reason: HOSPADM

## 2018-07-18 RX ORDER — FOLIC ACID 1 MG/1
1 TABLET ORAL DAILY
Status: DISCONTINUED | OUTPATIENT
Start: 2018-07-19 | End: 2018-07-24 | Stop reason: HOSPADM

## 2018-07-18 RX ADMIN — SODIUM CHLORIDE 100 ML: 900 INJECTION, SOLUTION INTRAVENOUS at 15:18

## 2018-07-18 RX ADMIN — SODIUM CHLORIDE 100 ML/HR: 900 INJECTION, SOLUTION INTRAVENOUS at 13:26

## 2018-07-18 RX ADMIN — LORAZEPAM 1 MG: 1 TABLET ORAL at 22:39

## 2018-07-18 RX ADMIN — B-COMPLEX W/ C & FOLIC ACID TAB 1 TABLET: TAB at 08:43

## 2018-07-18 RX ADMIN — Medication 10 ML: at 13:27

## 2018-07-18 RX ADMIN — ZOLPIDEM TARTRATE 5 MG: 5 TABLET ORAL at 00:04

## 2018-07-18 RX ADMIN — OXYCODONE HYDROCHLORIDE AND ACETAMINOPHEN 1 TABLET: 10; 325 TABLET ORAL at 21:09

## 2018-07-18 RX ADMIN — SODIUM CHLORIDE 100 ML/HR: 900 INJECTION, SOLUTION INTRAVENOUS at 19:00

## 2018-07-18 RX ADMIN — Medication 10 ML: at 15:18

## 2018-07-18 RX ADMIN — WARFARIN SODIUM 5 MG: 5 TABLET ORAL at 18:07

## 2018-07-18 RX ADMIN — SODIUM CHLORIDE 100 ML/HR: 900 INJECTION, SOLUTION INTRAVENOUS at 03:30

## 2018-07-18 RX ADMIN — HEPARIN SODIUM AND DEXTROSE 18 UNITS/KG/HR: 5000; 5 INJECTION INTRAVENOUS at 14:22

## 2018-07-18 RX ADMIN — OXYCODONE HYDROCHLORIDE AND ACETAMINOPHEN 1 TABLET: 10; 325 TABLET ORAL at 08:51

## 2018-07-18 RX ADMIN — OXYCODONE HYDROCHLORIDE AND ACETAMINOPHEN 1 TABLET: 10; 325 TABLET ORAL at 12:56

## 2018-07-18 RX ADMIN — MORPHINE SULFATE 2 MG: 2 INJECTION, SOLUTION INTRAMUSCULAR; INTRAVENOUS at 16:35

## 2018-07-18 RX ADMIN — Medication 10 ML: at 21:11

## 2018-07-18 RX ADMIN — FOLIC ACID: 5 INJECTION, SOLUTION INTRAMUSCULAR; INTRAVENOUS; SUBCUTANEOUS at 08:43

## 2018-07-18 RX ADMIN — Medication 10 ML: at 05:03

## 2018-07-18 RX ADMIN — IOPAMIDOL 80 ML: 755 INJECTION, SOLUTION INTRAVENOUS at 15:18

## 2018-07-18 RX ADMIN — MORPHINE SULFATE 2 MG: 2 INJECTION, SOLUTION INTRAMUSCULAR; INTRAVENOUS at 03:30

## 2018-07-18 NOTE — PROGRESS NOTES
07/18/18 1625   Dual Skin Pressure Injury Assessment   Dual Skin Pressure Injury Assessment WDL   Second Care Provider (Based on [address] [place] [address]) Minus Ink RN   To room 634 from Ct. Awake, Alert, Oriented x 4. Oriented to room and floor. Resting in bed watching TV Drsg to Rt side of neck clean, dry and, intact. Some old scars noted. Skin intact. Dual skin assessment completed with Minus Ink RN.

## 2018-07-18 NOTE — PROGRESS NOTES
Problem: Nutrition Deficit  Goal: *Optimize nutritional status  Nutrition  Reason for assessment: Referral received from nursing admission Malnutrition Screening Tool for recently lost 24-33# without trying and eating poorly due to decreased appetite. Assessment:   Diet order(s): regular  Food/Nutrition Patient History:  Pt seen up and eating breakfast this morning. He reports weight loss PTA from \"working out in the heat. \" Note potential for malignancy. He denies any barriers to intake and reports a good appetite prior to a few days of abdominal pain PTA. Note h/o EtOH and tobacco abuse, in addition to gastric perforation in 2014. S/P venogram 7/16. Anthropometrics:Height: 5' 8\" (172.7 cm),  Weight: 71.4 kg (157 lb 6.5 oz),  , Body mass index is 23.93 kg/(m^2). BMI class of normal weight. No edema noted. WT / BMI 7/18/2018 3/15/2018 5/11/2017 2/8/2017   WEIGHT 157 lb 6.5 oz 160 lb 170 lb 180 lb   Per weights listed in EMR, potential for a 4 pound 2.5% clinically insignificant weight loss over 4 months and a 13 pound, 7.6% clinically insignificant weight loss over ~14 months. Macronutrient needs:  EER:  5742-7178 kcal /day (25-30 kcal/kg listed BW)  EPR:  56-70 grams protein/day (1-1.2 grams/kg IBW)(GFR >60)  Intake/Comparative Standards: Per RD meal rounds: 75% of breakfast. Average intake for past 3 day(s)/1 recorded meal(s): 100%. One recorded meal intake does not represent a trend. Nutrition Diagnosis: Unintended weight loss r/t unknown etiology/potenital malignancy as evidenced by pt with weight loss noted above. Intervention:  Meals and snacks: Continue current diet. Add double portions. Nutrition Supplement Therapy: none necessary    Discharge nutrition plan: Too soon to determine.   Coordination of Nutrition Care: AM ICU interdisciplinary rounds    Cathie Seals 87, [address] [place] [address], [address] [place] [address], [phone]

## 2018-07-18 NOTE — PROGRESS NOTES
Warfarin dosing per pharmacist    Shae Bergeron is a 50 y.o. male. Height: 5' 8\" (172.7 cm)    Weight: 71.4 kg (157 lb 6.5 oz)    Indication:  IVC thrombus    Goal INR:  2-3    Home dose:  New start    Risk factors or significant drug interactions:  none    Other anticoagulants:  Heparin drip    Daily Monitoring  Date  INR     Warfarin dose HGB              Notes  7/17  1.2  5 mg  12.2  7/18  1.2  5 mg  12    Pharmacy consulted to start warfarin for IVC thrombus. S/p IVC Venogram.  Suction thrombectomy. Supra-renal IVC Filter retrieval and replacement into the infra-renal IVC      INR unchanged. Will give 5 mg again tonight. Will follow daily INR for changes.       Thank you,  Za Huang, PharmD  Clinical Pharmacist  [phone]

## 2018-07-18 NOTE — PROGRESS NOTES
Hospitalist Progress Note    2018  Admit Date: 7/15/2018  2:11 PM   NAME: Lucy Tanner   :  1970   MRN:  [id number]   Attending: Josefina Cruz MD  PCP:  General Laurent MD    SUBJECTIVE:   48yo AAM with PMH of Gastric Perforation s/p abdominal surgery , tobacco and Etoh dependence, Liver Hemangiomas, Recent Wt loss presented with abd pain, N+V and 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 Seen by IR and s/p SVC filter and EKOS , transferred to ICU.     : Feels better, no bleeding, BP better, Has been bradycardic but no CP or dizziness. Nursing notes and chart reviewed. Review of Systems negative with exception of pertinent positives noted above. PHYSICAL EXAM     Visit Vitals    /74    Pulse (!) 56    Temp 98.7 °F (37.1 °C)    Resp 20    Ht 5' 8\" (1.727 m)    Wt 71.4 kg (157 lb 6.5 oz)    SpO2 98%    BMI 23.93 kg/m2      Temp (24hrs), Av.6 °F (37 °C), Min:97.7 °F (36.5 °C), Max:99 °F (37.2 °C)    Oxygen Therapy  O2 Sat (%): 98 % (18 1058)  Pulse via Oximetry: 55 beats per minute (18 1058)  O2 Device: Room air (18 0709)  O2 Flow Rate (L/min): 2 l/min (18 1654)  ETCO2 (mmHg): 40 mmHg (18 1503)    Intake/Output Summary (Last 24 hours) at 18 1134  Last data filed at 18 0844   Gross per 24 hour   Intake           1902.9 ml   Output             3000 ml   Net          -1097.1 ml         General: No acute distress. Alert.    Head:  AT/NC  Lungs:  CTABL. Heart:  RRR, no murmur, rub, or gallop  Abdomen: Soft, non-distended, non-tender, +bs  Extremities: No cyanosis or clubbing. Neurologic:  No focal deficits. Moves all extremities. Skin:  No Obvious Rash  Psych:  Normal affect. LABS AND STUDIES:  Personally reviewed all labs, meds, and studies for past 24hrs.       ASSESSMENT      Active Hospital Problems    Diagnosis Date Noted    [facility] ([facility] 2018    Hemangioma of liver 2018    Excessive weight loss 07/17/2018    Bradycardia, sinus 07/17/2018    Abdominal pain 07/15/2018           PLAN:    · IVC Thrombus: Appreciate IR input s/p SVC Filter and EKOS, started on hep gtt and coumadin. · Heme consult for Thrombosis and suspected Malignancy given Wt loss and suspicious polyp removed recently. · On nicotine patch  · Bradycardia: Asymptomatic, EKG is sinus anay, watch on tele. · on IV Folate/thiamine/MVT, DT precautions. Dispo: Transfer to floor. Will need to bridge to Coumadin with therapeutic INR prior to DC, Cannot afford NOACs. DVT ppx: On hep gtt  Discussed plan with pt who is in agreement. All questions answered.     Signed By: Lei Crigler, MD     July 18, 2018

## 2018-07-18 NOTE — PROGRESS NOTES
A follow up visit was made to the patient. Emotional support, spiritual presence and   prayer were provided the patient and a friend.       [employer]

## 2018-07-18 NOTE — PROGRESS NOTES
TRANSFER - IN REPORT:    Verbal report received from [facility] on Lucia Ortiz  being received from ICU for routine progression of care      Report consisted of patients Situation, Background, Assessment and   Recommendations(SBAR). Information from the following report(s) SBAR, Kardex, Procedure Summary, Intake/Output, MAR, [facility]ion, Recent Results, Med Rec Status and Cardiac Rhythm SB was reviewed with the receiving nurse. Opportunity for questions and clarification was provided. Assessment completed upon patients arrival to unit and care assumed.

## 2018-07-18 NOTE — PROGRESS NOTES
[facility] CRITICAL CARE OUTREACH NURSE PROGRESS REPORT      SUBJECTIVE: Called to assess patient secondary to outreach protocol. MEWS Score: 1 (07/18/18 1128)  Vitals:    07/18/18 1258 07/18/18 1328 07/18/18 1358 07/18/18 1625   BP: 114/74 105/72 106/72 125/84   Pulse: 60 62 (!) 57 (!) 43   Resp: 18 12 15 17   Temp:    98.1 °F (36.7 °C)   SpO2: 98% 97% 98% 95%   Weight:       Height:              LAB DATA:    Recent Labs      07/18/18   0335  07/17/18   0352  07/16/18   0544   NA  144  144  141   K  4.2  3.8  3.5   CL  109*  108*  104   CO2  27  29  28   AGAP  8  7  9   GLU  87  78  82   BUN  12  13  10   CREA  0.98  0.80  1.04   GFRAA  >60  >60  >60   GFRNA  >60  >60  >60   CA  7.5*  7.9*  8.6        Recent Labs      07/18/18   0335  07/17/18   0916  07/17/18   0212   WBC  5.9  3.5*  4.8   HGB  12.0*  12.2*  12.6*   HCT  37.0*  37.0*  38.5*   PLT  197  213  221          OBJECTIVE: On arrival to room, I found patient to be resting in bed. Pain Assessment  Pain Intensity 1: 5 (07/18/18 1256)  Pain Location 1: Neck  Pain Intervention(s) 1: Medication (see MAR)  Patient Stated Pain Goal: 0                                 ASSESSMENT:  Pt resting in bed, wife at bedside, on RA, sat acceptable, on heparin gtt. Pt in NAD, no immediate concerns. PLAN:        Will continue to follow up per outreach protocol.     Signed By:   Scott Yeung RN    July 18, 2018 5:10 PM

## 2018-07-18 NOTE — PROGRESS NOTES
TRANSFER - OUT REPORT:    Verbal report given to Farzad Barber RN (name) on Washington Rowland  being transferred to [phone] (unit) for routine progression of care       Report consisted of patients Situation, Background, Assessment and   Recommendations(SBAR). Information from the following report(s) SBAR, Kardex, ED Summary, Procedure Summary, Intake/Output, MAR, Recent Results and Cardiac Rhythm SB was reviewed with the receiving nurse. Lines:   Peripheral IV Right Antecubital (Active)   Site Assessment Clean, dry, & intact 7/18/2018 11:28 AM   Phlebitis Assessment 0 7/18/2018 11:28 AM   Infiltration Assessment 0 7/18/2018 11:28 AM   Dressing Status Clean, dry, & intact 7/18/2018 11:28 AM   Dressing Type Tape;Transparent 7/18/2018 11:28 AM   Hub Color/Line Status Pink;Patent; Flushed 7/18/2018 11:28 AM   Alcohol Cap Used No 7/18/2018 11:28 AM       Peripheral IV 07/16/18 Left Forearm (Active)   Site Assessment Clean, dry, & intact 7/18/2018 11:28 AM   Phlebitis Assessment 0 7/18/2018 11:28 AM   Infiltration Assessment 0 7/18/2018 11:28 AM   Dressing Status Clean, dry, & intact 7/18/2018 11:28 AM   Dressing Type Tape;Transparent 7/18/2018 11:28 AM   Hub Color/Line Status Pink;Patent; Infusing 7/18/2018 11:28 AM   Alcohol Cap Used No 7/18/2018 11:28 AM        Opportunity for questions and clarification was provided.       Patient transported with:   telebox, patient belongings

## 2018-07-18 NOTE — PROGRESS NOTES
Physical Therapy Note:    Participated in interdisciplinary rounds in ICU/CCU and chart reviewed. Patient is experiencing decrease in function from baseline. Patient would benefit from skilled acute therapy to increase independence with self care/ADLs, strength, endurance, and functional mobility. Recommend PT/OT consult when medically stable and MD agrees.     Thank you for your consideration,  Charlee Garay DPT

## 2018-07-18 NOTE — INTERDISCIPLINARY ROUNDS
Interdisciplinary team rounds were held 7/18/2018 with the following team members:Care Management, Nursing, Nurse Practitioner, Nutrition, Palliative Care, Pharmacy, Physical Therapy, Physician, Respiratory Therapy, Speech Therapy and Clinical Coordinator and the patient. Plan of care discussed. See clinical pathway and/or care plan for interventions and desired outcomes.

## 2018-07-18 NOTE — PROGRESS NOTES
Bedside shift change report given to Rona Gayle RN (oncoming nurse) by Ashley Wiseman RN (offgoing nurse). Report included the following information SBAR, Kardex, ED Summary, Procedure Summary, Intake/Output, MAR, Recent Results and Cardiac Rhythm SB/SR.        Heparin gtt signed off  R IJ post sheath dressing clean, dry, and intact

## 2018-07-18 NOTE — CONSULTS
[facility]        Inpatient Hematology / Oncology Consult Note    Reason for Consult:  Abdominal pain  Referring Physician:  Neela York MD    History of Present Illness:  Mr. Kandice Monson is a 50 y.o. male admitted on 7/15/2018 with a primary diagnosis of The encounter diagnosis was Inferior vena cava thromboembolism, acute ([facility] [address] His PMH includes gastric perforation with abdominal surgery in 2014 and colonoscopy with polyp removal.  Pt reports that he was told one polyp was suspicious. See report from [facility] below. He presented to ED with c/o severe abdominal pain with N/V, chills x 4 days, weight loss, and generalized weakness. CT revealed large thrombus within the IVC, possibly extending into the iliac veins and large hepatic hemangiomas (followed by Dr. Carson Velasco). On Heparin gtt. S/p EKOS and IVC filter placement. Mr. Kandice Monson reports a strong family history of colon cancer. He also reports drinking \"two 40s\" of beer daily for \"a long time\". We were consulted for IVC thrombus and ?possible malignancy. Colonoscopy 1/9/18 (copied from Care Everywhere):  Colonoscopy per Dr. Shari Scanlon 1/9/18 for screening;  1) A single diminutive polyp was found in the cecum; removed by cold snare polypectomy. 2) 3 pedunculated polyps at 50 cm in the descending colon hot snared and recovered. One was large and lobulated. The site was marked with a tattoo above and below for future reference. 3) 3 sigmoid polyps between 30 and 35 cm were removed with hot snare cautery. 4) One polyp required a lengthy burn so a clip was placed across this polypectomy site. 5) 4 small rectal polyps were also removed with hot snare and recovered. A. CECAL POLYP:   TUBULAR ADENOMA. B. COLON POLYPS AT 50 CM:   RETENTION POLYP/JUVENILE POLYP (1).  TUBULAR ADENOMAS (2) WITH PROMINENT HYPERPLASTIC CHANGE (SEE COMMENT).   C. COLON POLYPS AT 30 TO 35 CM:   HYPERPLASTIC POLYP WITH PROLAPSE - TYPE CHANGE (SEE COMMENT). D. 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  RETENTION POLYP/JUVENILE POLYP (1).  FRAGMENTS OF BENIGN COLONIC MUCOSA (2). Surveillance is in 2021. Review of Systems:  Constitutional +weight loss +generalized weakness +chills. Denies fever, appetite changes, fatigue, night sweats. HEENT Denies trauma, blurry vision, hearing loss, ear pain, nosebleeds, sore throat, neck pain and ear discharge. Skin Denies lesions or rashes. Lungs Denies dyspnea, cough, sputum production or hemoptysis. Cardiovascular Denies chest pain, palpitations, or lower extremity edema. Gastrointestinal +abdominal pain +N/V. Denies changes in bowel habits, bloody or black stools.  Denies dysuria, frequency or hesitancy of urination. Neuro Denies headaches, visual changes or ataxia. Denies dizziness, tingling, tremors, sensory change, speech change, focal weakness or headaches. Hematology Denies easy bruising or bleeding, denies gingival bleeding or epistaxis. Endo Denies heat/cold intolerance, denies diabetes or thyroid abnormalities. MSK Denies back pain, arthralgias, myalgias or frequent falls. Psychiatric/Behavioral Denies depression and substance abuse. The patient is not nervous/anxious. No Known Allergies  Past Medical History:   Diagnosis Date    Chronic kidney disease      Past Surgical History:   Procedure Laterality Date    HX GI  10/3/14    gastric perforation    HX ORTHOPAEDIC Left     arm     Family History   Problem Relation Age of Onset    Cancer Mother      colon    Cancer Father      liver     Social History     Social History    Marital status: LIFE PARTNER     Spouse name: N/A    Number of children: N/A    Years of education: N/A     Occupational History    Not on file.      Social History Main Topics    Smoking status: Current Every Day Smoker     Packs/day: 0.50    Smokeless tobacco: Not on file    Alcohol use Yes    Drug use: Not on file    Sexual activity: Not on file     Other Topics Concern    Not on file     Social History Narrative     Current Facility-Administered Medications   Medication Dose Route Frequency Provider Last Rate Last Dose    [START ON 7/66/6008] folic acid (FOLVITE) tablet 1 mg  1 mg Oral DAILY Luke Paz MD       [facility] [START ON 7/19/2018] thiamine HCL (B-1) tablet 100 mg  100 mg Oral DAILY Luke Paz MD        enalaprilat (VASOTEC) injection 1.25 mg  1.25 mg IntraVENous Q6H PRN Luke Paz MD        multivitamin, stress formula (STRESS TAB) tablet 1 Tab  1 Tab Oral DAILY Luke Paz MD   1 Tab at 07/18/18 0843    fentaNYL citrate (PF) injection 25-50 mcg  25-50 mcg IntraVENous Multiple Vikki Isaac MD   50 mcg at 07/17/18 1537    heparin (PF) 2 units/ml in NS infusion 6,000 Units  3,000 mL IntraSHEAth PRN Vikki Isaac MD   4,000 Units at 07/17/18 1617    oxyCODONE-acetaminophen (PERCOCET 10)  mg per tablet 1 Tab  1 Tab Oral Q4H PRN Vikki Isaac MD   1 Tab at 07/18/18 0851    heparin 25,000 units in dextrose 500 mL infusion  18-36 Units/kg/hr IntraVENous TITRATE Vikki Isaac MD 26 mL/hr at 07/18/18 0704 18 Units/kg/hr at 07/18/18 0704    warfarin (COUMADIN) tablet 5 mg  5 mg Oral QPM Vikki Isaac MD   5 mg at 07/17/18 1753    0.9% sodium chloride infusion  100 mL/hr IntraVENous CONTINUOUS Vikki Isaac  mL/hr at 07/18/18 0330 100 mL/hr at 07/18/18 0330    zolpidem (AMBIEN) tablet 5 mg  5 mg Oral QHS PRN Vikki Isaac MD   5 mg at 07/18/18 0004    sodium chloride (NS) flush 5-10 mL  5-10 mL IntraVENous Q8H Castillo Machado NP   10 mL at 07/18/18 0503    sodium chloride (NS) flush 5-10 mL  5-10 mL IntraVENous PRN Gus Cee NP        naloxone (NARCAN) injection 0.4 mg  0.4 mg IntraVENous PRN Gus Cee NP        morphine injection 2 mg  2 mg IntraVENous Q3H PRN Gus Cee NP   2 mg at 07/18/18 0331  ondansetron (ZOFRAN) injection 4 mg  4 mg IntraVENous Q4H PRN Ankit Solares NP        LORazepam (ATIVAN) tablet 1 mg  1 mg Oral Q6H PRN Ankit Solares NP   1 mg at 18    nicotine (NICODERM CQ) 14 mg/24 hr patch 1 Patch  1 Patch TransDERmal DAILY Ankit Solares NP   1 Patch at 18 0844       OBJECTIVE:  Patient Vitals for the past 8 hrs:   BP Temp Pulse Resp SpO2 Weight   18 0758 (!) 119/91 - (!) 49 12 98 % -   18 0728 117/82 - (!) 49 12 98 % -   18 0709 110/86 98.7 °F (37.1 °C) (!) 52 14 98 % -   18 0658 108/83 - (!) 47 17 99 % -   18 0628 92/65 - (!) 49 13 99 % -   18 0621 - - - - - 157 lb 6.5 oz (71.4 kg)   18 0558 109/79 - (!) 52 13 98 % -   18 0527 103/78 - (!) 48 20 98 % -   18 0457 98/77 - (!) 53 14 98 % -   18 0428 99/75 - (!) 47 12 99 % -   18 0358 102/77 98.9 °F (37.2 °C) (!) 48 13 99 % -   18 0327 112/82 - (!) 52 14 98 % -   18 0257 106/82 - (!) 59 12 99 % -   18 0228 101/67 - (!) 56 15 97 % -     Temp (24hrs), Av.6 °F (37 °C), Min:97.7 °F (36.5 °C), Max:99 °F (37.2 °C)     0701 -  1900  In: 360 [P.O.:360]  Out: -     Physical Exam:  Constitutional: Well developed, well nourished male in no acute distress, sitting comfortably in the hospital bed. Wife at bedside. HEENT: Normocephalic and atraumatic. Oropharynx is clear, mucous membranes are moist.  Extraocular muscles are intact. Sclerae anicteric. Neck supple without JVD. No thyromegaly present. Skin Warm and dry. No bruising and no rash noted. No erythema. No pallor. Respiratory Lungs are clear to auscultation bilaterally without wheezes, rales or rhonchi, normal air exchange without accessory muscle use. CVS Bradycardic rate, regular rhythm and normal S1 and S2. No murmurs, gallops, or rubs. Abdomen Soft, nontender and nondistended, normoactive bowel sounds. No palpable mass. No hepatosplenomegaly.    Neuro Grossly nonfocal with no obvious sensory or motor deficits. MSK Normal range of motion in general.  No edema and no tenderness. Psych Appropriate mood and affect.         Labs:    Recent Results (from the past 24 hour(s))   PROTHROMBIN TIME + INR    Collection Time: 07/18/18  3:35 AM   Result Value Ref Range    Prothrombin time 14.2 11.5 - 14.5 sec    INR 1.2     CBC W/O DIFF    Collection Time: 07/18/18  3:35 AM   Result Value Ref Range    WBC 5.9 4.3 - 11.1 K/uL    RBC 3.64 (L) 4.23 - 5.67 M/uL    HGB 12.0 (L) 13.6 - 17.2 g/dL    HCT 37.0 (L) 41.1 - 50.3 %    .6 (H) 79.6 - 97.8 FL    MCH 33.0 (H) 26.1 - 32.9 PG    MCHC 32.4 31.4 - 35.0 g/dL    RDW 12.6 11.9 - 14.6 %    PLATELET 311 888 - 383 K/uL    MPV 9.4 (L) 10.8 - 78.5 FL   METABOLIC PANEL, BASIC    Collection Time: 07/18/18  3:35 AM   Result Value Ref Range    Sodium 144 136 - 145 mmol/L    Potassium 4.2 3.5 - 5.1 mmol/L    Chloride 109 (H) 98 - 107 mmol/L    CO2 27 21 - 32 mmol/L    Anion gap 8 7 - 16 mmol/L    Glucose 87 65 - 100 mg/dL    BUN 12 6 - 23 MG/DL    Creatinine 0.98 0.8 - 1.5 MG/DL    GFR est AA >60 >60 ml/min/1.73m2    GFR est non-AA >60 >60 ml/min/1.73m2    Calcium 7.5 (L) 8.3 - 10.4 MG/DL   PTT    Collection Time: 07/18/18  3:35 AM   Result Value Ref Range    aPTT 94.0 (HH) 23.2 - 35.3 SEC       Imaging:  IR REMOVE THER TXCATH INF THROMB CESSATION [[id number]] Collected: 07/17/18 1710      Order Status: Completed Updated: 07/17/18 4133     Narrative:       Title:  Right common iliac venogram.  Inferior venacavogram.  Venous suction  thrombectomy.  Suprarenal IVC filter retrieval.  Infrarenal IVC filter placement    Indication:  Very pleasant 77-year-old man with a large thrombus in an unusual,  infrarenal, inferior vena cava location.  The thrombus appears to extend from  the common iliac vein confluence along the right side of the inferior vena cava  to the level of the renal veins.  A suprarenal IVC filter was placed yesterday  and ultrasound mediated, catheter directed, tPA thrombolysis was initiated. Patient presents today for follow-up venography.  30 pound weight loss. Consent: This is a continuation of yesterday's procedure. Procedure:  Maximal sterile barrier technique (including:  cap, mask, sterile  gown, sterile gloves, sterile sheet, hand hygiene, chlorhexidene for antiseptic  skin preparation, sterile ultrasound techniques including sterile gel and  sterile ultrasound probe cover) was used.  Local anesthesia with lidocaine was  achieved around the indwelling right internal jugular vein sheath.      The EKOS tPA infusion catheter was removed over an Amplatz wire using  fluoroscopic guidance.  A 2 marker pigtail catheter was positioned into the  right common iliac vein and a power injection venogram was performed. The catheter was retracted into the inferior vena cava and an IVC a gram was  performed. The diagnostic catheter was exchanged for a 9-[place] guide catheter and suction  thrombectomy was performed in the infrarenal inferior vena cava returning only a  very small amount of thrombus. Post thrombectomy infrarenal inferior venacavography was performed. The 11-[place] sheath was removed over an Amplatz wire.  Using the Michelle Osgood IVC  filter retrieval set, the suprarenal IVC filter was removed in its entirety  without difficulty.  Small particles of white, thrombus/foreign body were  identified on the filter, retrieved, and placed in formalin. Using the larger diameter filter recovery sheath, infrarenal IVC suction  thrombectomy was performed returning soft and gelatinous maroon clot as well as  firm, white, thrombus/foreign body.  This firm, white, material was added to the  formalin container.     Since significantly more thrombus/foreign body was retrieved, the marker pigtail  catheter was replaced into the right common iliac vein and 3 venograms were  performed with imaging in 3 projections and 2 magnification. Using bony landmarks as a guide, a new Cook select platinum IVC filter was  delivered into the infrarenal inferior vena cava. All catheters wires and sheaths were removed.  Hemostasis was achieved with  manual compression. Complications:  None. Medications:  Under physician supervision, 59 minutes of moderate intravenous  conscious sedation was performed by administering Versed, Fentanyl, Benadryl  intravenously. Continuous pulse oximetry, heart rate, and blood pressure  monitoring was performed with an independent, trained observer present.      Radiation Exposure Indices:  Reference Air Kerma (Ka,r) = 838 mGy  Dose Area Product/Kerma Area Product (DAP/GISSELLE/PKA) = 10,609.4  cGy-cm2  Fluoroscopy Exposure Time = 8 minutes 30 seconds  Fluorographic Images = 7 venograms     Contrast:  140 cc Isovue-300. Findings:  Initial venograms today demonstrate slightly less thrombus volume in  the infrarenal inferior vena cava.  Previously, the thrombus appeared stiff and  adherent to a long section of the IVC.  Today, the thrombus was more serpiginous  and multiple within the inferior vena cava, but appears to be remain adherent to  the distal IVC/common iliac vein confluence. 9-[place] guide catheter thrombectomy was minimally successful, returning only a  small volume of gelatinous/liquid maroon thrombus. Thrombectomy performed through the larger, filter retrieval, sheath was much  more successful.  Larger maroon thrombi as well as white thrombus/tissue was  returned.  This white tissue was firm, and did not appear like typical platelet  thrombus.  Therefore, this gray-white material was placed in formalin and sent  to the pathology department for further evaluation.     Final venographic imaging suggests a rounded, triangular, filling defect  adherent to the right lateral wall of the right common iliac vein, at the level  of the iliac vein confluence.  Differential diagnosis would includes residual  thrombus versus a primary venous malignancy such as leiomyosarcoma, angiosarcoma  or hemangioendothelioma. The suprarenal inferior vena cava filter was successfully removed.  A new,  infrarenal, inferior vena cava filter was placed.       Impression:       Impression:  Nonocclusive residual thrombus versus primary vein sarcoma in the  proximal right common iliac vein, at the iliac vein thrombus.  Specimen sent to  pathology. Plan:  One hour bedrest.   Resume heparin, weight-based protocol, no bullous, in  4 hours.  Anticipate conversion to Coumadin oral anticoagulation during this  hospitalization.  Await hematology/oncology recommendations concerning weight  loss and workup. Office visit in 2-3 months to reassess and determine the need for IVC filter  retrieval.         IR Jadaa Beer OR INTRACRANIAL [[id number]] Collected: 07/17/18 0850     Order Status: Completed Updated: 07/17/18 1024     Narrative:       Title: Left iliac venogram, Inferior Vena Cavogram.  Inferior Vena Cava Filter  placement.  Catheter placement for the initiation of of ultrasound mediated,  catheter directed, tPA thrombolysis. History: Very pleasant 44-year-old man with CT scan evidence of a large thrombus  in the inferior vena cava.  Contrast enhancement is poor, although iliac vein  thrombus may also be present.  Lower extremity ultrasound demonstrates no DVT. Approximate 30 pound weight loss in the last few months.  No previous history of  malignancy although he is beginning a workup during this hospitalization.  Daily  alcohol use.  2, large, hepatic hemangiomas.     Consent: Informed written and oral consent was obtained from the patient after  explanation of benefits and risks (including, but not limited to:  infection,  hemorrhage, leg swelling, filter fracture/migration/penetration) of the  procedure.  I specifically discussed the potential for increased bleeding risk  in relation to the hepatic hemangiomas.  As there is no documented risk in the  literature, the patient and I agreed to proceed.  His questions were answered to  his satisfaction. [facility] stated understanding and requested that we proceed.      Procedure:  Maximal sterile barrier technique (including:  cap, mask, sterile  gown, sterile gloves, sterile sheet, hand hygiene, chlorhexidene for antiseptic  skin preparation, sterile ultrasound techniques including sterile gel and  sterile ultrasound probe cover) was used.      A local field block with lidocaine was achieved. Ultrasound evaluation of all  potential access sites was performed due to lack of a palpable vein. The vein  was not palpable due to overlying adipose tissue. Using real-time ultrasound  guidance, with appropriate image recording, the patent right internal jugular  vein was accessed. Using fluoroscopy, a 2 marker pigtail catheter was positioned  into the left internal iliac vein. From this location, venography was performed  with imaging of the left pelvis and abdomen. The angiographic catheter was retracted into the left common iliac vein/inferior  vena cava junction and venography was repeated. The pigtail catheter was exchanged for the IVC Filter delivery system over an  Amplatz wire. Using bony landmarks as a reference, the Joylene Rouge select inferior vena  cava filter was partially exposed above the thrombus and advanced gently in an  attempt to displace the thrombus caudally.  Unfortunately, this was unsuccessful  as the thrombus would not engage.  The filter was retracted and inspected and  found to be intact.  Therefore, it was readvanced through the delivery system  and eventually deployed in a suprarenal position.     The Amplatz wire was advanced through the filter struts and into the right  common iliac vein.  The filter delivery sheath was exchanged for an 11-[place],  23 cm long, bright tip vascular sheath.  A 9-[place] Cordis guide catheter was  advanced over the wire.  Multiple attempts were made to aspirate the thrombus  returning only blood and no clot.  A distal inferior venacavogram was performed  demonstrating the thrombus to be unchanged. Therefore, the guide catheter was exchanged over the Amplatz wire for a 6 cm  treatment zone EKOS catheter.  The sideholes were positioned within the inferior  vena cava adjacent to the thrombus. Sterile dressings were applied. Complications: None. Radiation Exposure Indices:  Reference Air Kerma (Ka,r) = 586 mGy  Dose Area Product/Kerma Area Product (DAP/GISSELLE/PKA) = [id number] cGy-cm2  Fluoroscopy Exposure Time = 4 minutes 36 seconds   Fluorographic Images = 4 venograms     Contrast:  70 milliliters. Medications:  Under physician supervision, 61 minutes of moderate intravenous  conscious sedation was performed by administering Versed, Fentanyl, Benadryl  intravenously. Continuous pulse oximetry, heart rate, and blood pressure  monitoring was performed with an independent, trained observer present.      Findings: Widely patent right internal jugular vein. Patent left external iliac and common iliac veins.  The left internal iliac vein  is slightly narrowed at its junction with the common iliac vein.  The left  common iliac vein is flattened, but not stenotic. The right common iliac and internal iliac veins are patent. There is a sausage-like thrombus extending from the right common iliac/inferior  vena cava junction along the right side of the inferior vena cava to the level  of the renal veins.  Reflux confirms right renal vein patency.  Unopacified  blood inflow confirms left renal vein patency.  The suprarenal inferior vena  cava is patent.  Suprarenal IVC measures maximally 23 mm diameter.     The [facility] filter was placed in a suprarenal location in order to secure  the legs against the wall without intervening thrombus.  At the conclusion of  the procedure the filter is tilted approximately 5-10 degrees with the catheter  running between the struts into the distal IVC.       Impression:       Impression: Uncomplicated infrarenal [place] inferior vena cava  filter placement.  Initiation of catheter directed, ultrasound mediated, tPA  thrombolysis of the IVC. Plan: The patient has been transferred to the intensive care unit for tPA 1 mg  per hour, heparin 300 units per hour, bedrest.  Follow-up inferior venacavogram  tomorrow.         IR THROMBOLYSIS TRANSCATH NON CORONARY OR INTRACRANIAL [[id number]]      Order Status: Canceled      DUPLEX LOWER EXT VENOUS BILAT [[id number]] Collected: 07/16/18 1155     Order Status: Completed Updated: 07/16/18 1151     Narrative:       BILATERAL LOWER EXTREMITY VENOUS DUPLEX ULTRASOUND. HISTORY:  Pain. TECHNIQUE: Direct skin-contact high resolution grayscale images, color-flow  Doppler and duplex waveform analysis. FINDINGS: There is compressibility, color-flow assignment and augmentation of  the venous waveform with calf compression in the common femoral, superficial  femoral and popliteal veins. Upper calf veins are unremarkable. Thrombus is  identified in the mid inferior vena cava.       Impression:       IMPRESSION: Negative for sonographic evidence of deep venous thrombosis  bilateral lower extremity. Thrombus is identified in the mid inferior vena cava.       CT ABD PELV W CONT [[id number]] Collected: 07/15/18 1641     Order Status: Completed Updated: 07/15/18 1658     Narrative:       CT ABDOMEN AND PELVIS WITH IV CONTRAST     HISTORY:  Abdominal pain and burning.     COMPARISON:  CT abdomen and pelvis October 3, 2014    TECHNIQUE:  Helical scans through the abdomen and pelvis with oral and IV  contrast, Isovue-370, 100 cc  IV to improve conspicuity of infection and  neoplasia.  Radiation dose reduction techniques were used for this study:  All  CT scans performed at this facility use one or all of the following: Automated  exposure control, adjustment of the mA and/or kVp according to patient's size,  iterative reconstruction. CT ABDOMEN:  Bowel loops are not dilated. No abnormal extraluminal gas or fluid. Kidneys demonstrate normal enhancement and negative for evidence of urinary  obstruction. Large right lobe and smaller left lobe lesions with peripheral  noncontiguous enhancement most consistent with hemangiomas, unchanged. No new  liver lesions. IVC normal caliber. There is well-defined filling defect in the IVC extending from the renal vein  level caudally and into the left common iliac vein. Unfortunately the iliac  veins and femoral veins are not opacified, probably related to bolus timing. CT PELVIS:  No free fluid in the pelvis. No mass or lymphadenopathy.       Impression:       IMPRESSION:    1. Normal bowel gas pattern. 2. Large thrombus within the IVC, possibly extending into the iliac veins. 3. No change liver hemangiomas.          ASSESSMENT:  Problem List  Date Reviewed: 10/3/2014          Codes Class Noted    * (Principal)IVC thrombosis ([facility] ICD-10-CM: N73.901  ICD-9-CM: 453.2  7/17/2018        Hemangioma of liver ICD-10-CM: D18.03  ICD-9-CM: 228.04  7/17/2018        Excessive weight loss ICD-10-CM: R63.4  ICD-9-CM: 783.21  7/17/2018        Bradycardia, sinus ICD-10-CM: R00.1  ICD-9-CM: 427.89  7/17/2018        Abdominal pain ICD-10-CM: R10.9  ICD-9-CM: 789.00  7/15/2018        Lesion of liver ICD-10-CM: K76.9  ICD-9-CM: 573.8  10/14/2014        Gastric ulcer with perforation ([facility] ICD-10-CM: K25.5  ICD-9-CM: 531.50  10/3/2014                RECOMMENDATIONS:  IVC Thrombus  - CT revealed large thrombus within the IVC, possibly extending into iliac veins  - s/p EKOS and IVC filter placement  - Once IR finished with procedures, OK to transition to Eliquis or Xarelto  - Check Factor V, Prothrombin mutation, Beta-2 Glycoprotein AB IGA/IGG/IGM, Lupus AC panel, Anticardiolipin, Protein C & S, Antithrombin III panel, ESR, PNH screen, MPN panel    Hepatic Hemangiomas  - followed by Dr. Hoang Camarillo    Colonoscopy (1/9/18)  - Pt states he was told that one polyp was suspicious. Per GI notes in Care Everywhere, next colonoscopy scheduled for 2020. Colonoscopy per Dr. Ana Luisa Jenkins 1/9/18 for screening;  1) A single diminutive polyp was found in the cecum; removed by cold snare polypectomy. 2) 3 pedunculated polyps at 50 cm in the descending colon hot snared and recovered. One was large and lobulated. The site was marked with a tattoo above and below for future reference. 3) 3 sigmoid polyps between 30 and 35 cm were removed with hot snare cautery. 4) One polyp required a lengthy burn so a clip was placed across this polypectomy site. 5) 4 small rectal polyps were also removed with hot snare and recovered. A. CECAL POLYP:   TUBULAR ADENOMA. B. COLON POLYPS AT 50 CM:   RETENTION POLYP/JUVENILE POLYP (1).  TUBULAR ADENOMAS (2) WITH PROMINENT HYPERPLASTIC CHANGE (SEE COMMENT). C. COLON POLYPS AT 30 TO 35 CM:   HYPERPLASTIC POLYP WITH PROLAPSE - TYPE CHANGE (SEE COMMENT). D. 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  RETENTION POLYP/JUVENILE POLYP (1).  FRAGMENTS OF BENIGN COLONIC MUCOSA (2). Surveillance is in 2021.   - Given strong family history of colon cancer, pt may benefit from genetic counseling as OP. Pt thinks he may have done this in the past.  If so, we will need to obtain records. - Con't to f/u with GI prn  - Check CT chest to r/o malignancy    Lab studies and imaging studies were personally reviewed. Thank you for allowing us to participate in the care of Mr. Dulce Pollard. We will sign off; please do not hesitate to call with any questions. We will arrange f/u with Dr. Amee Juares in 2 weeks to discuss results of testing.          Araceli Grande NP   [facility]  [address] [address]  Office : [phone]  Fax : [phone] Attending Addendum:  I personally evaluated the patient with Loretta Liu N.P.,  and agree with the assessment, findings and plan as documented. Appears stable, heart regular without murmur, lungs clear, abdomen benign. 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 Liver hemangiomas were felt stable. He has since undergone tPA, suction thrombectomy, initial supra-renal IVC filter placement later changed to an infra-renal IVC. He should be able to go on newer oral anticoagulants on discharge. Will order hypercoagulable w/u including MPN panel and PNH screen. Given concern of underlying malignancy, will get CT chest as well (AP unrevealing). Check PSA. Last colonoscopy was in 01/18 w multiple polyps. Given significant family h/o colon cancer, may benefit from geentics counseling as well as outpt. F/u in hematology office within 2 weeks of discharge. Thank you for allowing us to participate in care of this pleasant patient. Please call w any questions.                 Niel Hodgkins, MD  [facility]  [id number] [place] [address]  Office : [phone]  Fax : [phone]

## 2018-07-19 LAB
APTT PPP: 85.2 SEC (ref 23.2–35.3)
HCT VFR BLD AUTO: 36.7 % (ref 41.1–50.3)
HGB BLD-MCNC: 12 G/DL (ref 13.6–17.2)
INR PPP: 1.1
PROTHROMBIN TIME: 13.7 SEC (ref 11.5–14.5)

## 2018-07-19 PROCEDURE — 85610 PROTHROMBIN TIME: CPT | Performed by: RADIOLOGY

## 2018-07-19 PROCEDURE — 74011250637 HC RX REV CODE- 250/637: Performed by: INTERNAL MEDICINE

## 2018-07-19 PROCEDURE — 85018 HEMOGLOBIN: CPT | Performed by: RADIOLOGY

## 2018-07-19 PROCEDURE — 65270000029 HC RM PRIVATE

## 2018-07-19 PROCEDURE — 36415 COLL VENOUS BLD VENIPUNCTURE: CPT | Performed by: RADIOLOGY

## 2018-07-19 PROCEDURE — 74011250637 HC RX REV CODE- 250/637: Performed by: NURSE PRACTITIONER

## 2018-07-19 PROCEDURE — 74011250636 HC RX REV CODE- 250/636: Performed by: NURSE PRACTITIONER

## 2018-07-19 PROCEDURE — 74011250637 HC RX REV CODE- 250/637: Performed by: RADIOLOGY

## 2018-07-19 PROCEDURE — 77030020263 HC SOL INJ SOD CL0.9% LFCR 1000ML

## 2018-07-19 PROCEDURE — 74011250636 HC RX REV CODE- 250/636: Performed by: RADIOLOGY

## 2018-07-19 PROCEDURE — 85730 THROMBOPLASTIN TIME PARTIAL: CPT | Performed by: RADIOLOGY

## 2018-07-19 RX ORDER — MAGNESIUM CITRATE
296 SOLUTION, ORAL ORAL
Status: COMPLETED | OUTPATIENT
Start: 2018-07-19 | End: 2018-07-19

## 2018-07-19 RX ORDER — WARFARIN 1 MG/1
2.5 TABLET ORAL ONCE
Status: COMPLETED | OUTPATIENT
Start: 2018-07-19 | End: 2018-07-19

## 2018-07-19 RX ADMIN — MORPHINE SULFATE 2 MG: 2 INJECTION, SOLUTION INTRAMUSCULAR; INTRAVENOUS at 12:26

## 2018-07-19 RX ADMIN — Medication 100 MG: at 09:06

## 2018-07-19 RX ADMIN — MAGESIUM CITRATE 296 ML: 1.75 LIQUID ORAL at 12:25

## 2018-07-19 RX ADMIN — MORPHINE SULFATE 2 MG: 2 INJECTION, SOLUTION INTRAMUSCULAR; INTRAVENOUS at 00:15

## 2018-07-19 RX ADMIN — SODIUM CHLORIDE 100 ML/HR: 900 INJECTION, SOLUTION INTRAVENOUS at 23:47

## 2018-07-19 RX ADMIN — B-COMPLEX W/ C & FOLIC ACID TAB 1 TABLET: TAB at 09:06

## 2018-07-19 RX ADMIN — SODIUM CHLORIDE 100 ML/HR: 900 INJECTION, SOLUTION INTRAVENOUS at 04:43

## 2018-07-19 RX ADMIN — Medication 10 ML: at 13:36

## 2018-07-19 RX ADMIN — Medication 10 ML: at 21:58

## 2018-07-19 RX ADMIN — Medication 10 ML: at 05:25

## 2018-07-19 RX ADMIN — MORPHINE SULFATE 2 MG: 2 INJECTION, SOLUTION INTRAMUSCULAR; INTRAVENOUS at 21:45

## 2018-07-19 RX ADMIN — HEPARIN SODIUM AND DEXTROSE 18 UNITS/KG/HR: 5000; 5 INJECTION INTRAVENOUS at 07:17

## 2018-07-19 RX ADMIN — FOLIC ACID 1 MG: 1 TABLET ORAL at 09:07

## 2018-07-19 RX ADMIN — MORPHINE SULFATE 2 MG: 2 INJECTION, SOLUTION INTRAMUSCULAR; INTRAVENOUS at 17:38

## 2018-07-19 RX ADMIN — WARFARIN SODIUM 2.5 MG: 1 TABLET ORAL at 13:38

## 2018-07-19 RX ADMIN — MORPHINE SULFATE 2 MG: 2 INJECTION, SOLUTION INTRAMUSCULAR; INTRAVENOUS at 09:07

## 2018-07-19 RX ADMIN — MORPHINE SULFATE 2 MG: 2 INJECTION, SOLUTION INTRAMUSCULAR; INTRAVENOUS at 04:42

## 2018-07-19 RX ADMIN — WARFARIN SODIUM 5 MG: 5 TABLET ORAL at 17:38

## 2018-07-19 NOTE — PROGRESS NOTES
RN perceived smell of cigarette in pt's bathroom. Pt and family denied smoking in the room. Pack of cigarette found on the floor in pt's room. Pt's family stated she went outside to smoke. Pt and family reminded reminded that smoking is not allowed in the room. Will continue to monitor.

## 2018-07-19 NOTE — PROGRESS NOTES
Hourly rounds completed throughout this shift. Family in room. Pt had a bm this shift after mag. Citrate given. Pt resting in bed; denies needs at this time. Will continue to monitor and report to oncoming night shift nurse.

## 2018-07-19 NOTE — PROGRESS NOTES
[facility] CRITICAL CARE OUTREACH NURSE PROGRESS REPORT      SUBJECTIVE: Called to assess patient secondary to outreach protocol. MEWS Score: 2 (07/18/18 1625)  Vitals:    07/18/18 1328 07/18/18 1358 07/18/18 1625 07/18/18 1944   BP: 105/72 106/72 125/84 110/68   Pulse: 62 (!) 57 (!) 43 (!) 57   Resp: 12 15 17 18   Temp:   98.1 °F (36.7 °C) 98.2 °F (36.8 °C)   SpO2: 97% 98% 95% 92%   Weight:       Height:            LAB DATA:    Recent Labs      07/18/18   0335  07/17/18   0352  07/16/18   0544   NA  144  144  141   K  4.2  3.8  3.5   CL  109*  108*  104   CO2  27  29  28   AGAP  8  7  9   GLU  87  78  82   BUN  12  13  10   CREA  0.98  0.80  1.04   GFRAA  >60  >60  >60   GFRNA  >60  >60  >60   CA  7.5*  7.9*  8.6        Recent Labs      07/18/18   0335  07/17/18   0916  07/17/18   0212   WBC  5.9  3.5*  4.8   HGB  12.0*  12.2*  12.6*   HCT  37.0*  37.0*  38.5*   PLT  197  213  221          OBJECTIVE: On arrival to room, I found patient to be Alert, eating, and watching TV. Pain Assessment  Pain Intensity 1: 2 (07/18/18 1720)  Pain Location 1: Abdomen, Neck  Pain Intervention(s) 1: Accupressure  Patient Stated Pain Goal: 0                                 ASSESSMENT:  Patient is alert and oriented x4. Patient denies any pain. HR= 58 and O2 sat= 98% on room air. VSS. No critical needs noted at this time. PLAN:  Will continue to round per protocol.

## 2018-07-19 NOTE — PROGRESS NOTES
[facility] CRITICAL CARE OUTREACH NURSE PROGRESS REPORT      SUBJECTIVE: Called to assess patient secondary to critical care outreach protocol. MEWS Score: 1 (07/19/18 6605)  Vitals:    07/18/18 1944 07/18/18 2304 07/19/18 0433 07/19/18 0738   BP: 110/68 122/77 128/85 116/76   Pulse: (!) 57 (!) 51 (!) 59 (!) 54   Resp: 18 18 18 16   Temp: 98.2 °F (36.8 °C) 98.2 °F (36.8 °C) 98 °F (36.7 °C) 97.9 °F (36.6 °C)   SpO2: 92% 93% 95% 98%   Weight:       Height:              LAB DATA:    Recent Labs      07/18/18   0335  07/17/18   0352   NA  144  144   K  4.2  3.8   CL  109*  108*   CO2  27  29   AGAP  8  7   GLU  87  78   BUN  12  13   CREA  0.98  0.80   GFRAA  >60  >60   GFRNA  >60  >60   CA  7.5*  7.9*        Recent Labs      07/19/18   0629  07/18/18   0335  07/17/18   0916  07/17/18   0212   WBC   --   5.9  3.5*  4.8   HGB  12.0*  12.0*  12.2*  12.6*   HCT  36.7*  37.0*  37.0*  38.5*   PLT   --   197  213  221          OBJECTIVE: On arrival to room, I found patient to be in bed sleeping with family member at bedside. Pain Assessment  Pain Intensity 1: 7 (07/19/18 0720)  Pain Location 1: Abdomen  Pain Intervention(s) 1: Medication (see MAR)  Patient Stated Pain Goal: 0                                 ASSESSMENT:  Pt A/O x 4. Denies pain at this time. Denies SOB. Patient anay 46. BP WDL. Spo2 99% on RA. Lung sounds clear. Abdomen semi soft, non tender, active BS. Heparin gtt infusing @ 18 units/kg/hr. NS infusing @ 100 cc/hr. No needs expressed at this time. PLAN:  Will continue to follow per outreach protocol.

## 2018-07-19 NOTE — PROGRESS NOTES
Date of Outreach Update:  Gabriela Dumont was seen and assessed. Previous Outreach assessment has been reviewed. There have been no significant clinical changes since the completion of the last dated Outreach assessment.     Signed By: Emely Felix RN     July 19, 2018 3:23 PM

## 2018-07-19 NOTE — PROGRESS NOTES
Hospitalist Progress Note    2018  Admit Date: 7/15/2018  2:11 PM   NAME: Lucy Tanner   :  1970   MRN:  [id number]   Attending: Josefina Cruz MD  PCP:  General Laurent MD  As per prior Notes:  \"  SUBJECTIVE:   46yo AAM with PMH of Gastric Perforation s/p abdominal surgery , tobacco and Etoh dependence, Liver Hemangiomas, Recent Wt loss presented with abd pain, N+V and 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 Seen by IR and s/p SVC filter and EKOS , transferred to ICU.     : Feels better, no bleeding, BP better, Has been bradycardic but no CP or dizziness. \"      2018- seen- anxious to leave - re-iterates need for cheap meds and assistance\"    Nursing notes and chart reviewed. Review of Systems negative with exception of pertinent positives noted above. PHYSICAL EXAM     Visit Vitals    /73 (BP 1 Location: Left arm, BP Patient Position: At rest)    Pulse 73    Temp 98.3 °F (36.8 °C)    Resp 20    Ht 5' 8\" (1.727 m)    Wt 71.4 kg (157 lb 6.5 oz)    SpO2 98%    BMI 23.93 kg/m2      Temp (24hrs), Av °F (36.7 °C), Min:97.3 °F (36.3 °C), Max:98.3 °F (36.8 °C)    Oxygen Therapy  O2 Sat (%): 98 % (18 1534)  Pulse via Oximetry: 70 beats per minute (18 1358)  O2 Device: Room air (18 0709)  O2 Flow Rate (L/min): 2 l/min (18 1654)  ETCO2 (mmHg): 40 mmHg (18 1503)    Intake/Output Summary (Last 24 hours) at 18 1658  Last data filed at 18 1552   Gross per 24 hour   Intake              240 ml   Output              400 ml   Net             -160 ml         General: No acute distress. Alert.    Head:  AT/NC  Lungs:  CTABL. Heart:  RRR, no murmur, rub, or gallop  Abdomen: Soft, non-distended, non-tender, +bs  Extremities: No cyanosis or clubbing. Neurologic:  No focal deficits. Moves all extremities. Skin:  No Obvious Rash  Psych:  Normal affect.      LABS AND STUDIES:  Personally reviewed all labs, meds, and studies for past 24hrs. ASSESSMENT      Active Hospital Problems    Diagnosis Date Noted    IVC thrombosis ([facility] [address] 07/17/2018    Hemangioma of liver 07/17/2018    Excessive weight loss 07/17/2018    Bradycardia, sinus 07/17/2018    Abdominal pain 07/15/2018           PLAN:    · IVC Thrombus: Appreciate IR input s/p SVC Filter and EKOS, continue on hep gtt and bridging to coumadin. · Heme consulted for Thrombosis and suspected Malignancy given Wt loss and suspicious polyp removed recently. · Pt INR level ordered for 2.5-3.5 verbally per Dr. Kishan Rahman- as per [facility] request - but not documented anywhere- I called IR to confirm and have not heard back from them- will also d/w hematology  · On nicotine patch  · Bradycardia: Asymptomatic, EKG is sinus anay, watch on tele. · on IV Folate/thiamine/MVT, DT precautions. Dispo: Continue to bridge to Coumadin with therapeutic INR prior to DC, Cannot afford NOACs. DVT ppx: On hep gtt  Discussed plan with pt who is in agreement. All questions answered.     Signed By: Kia Oscar MD     July 19, 2018

## 2018-07-19 NOTE — PROGRESS NOTES
Warfarin dosing per pharmacist    Ashley Knutson is a 50 y.o. male. Height: 5' 8\" (172.7 cm)    Weight: 71.4 kg (157 lb 6.5 oz)    Indication:  IVC thrombus    Goal INR:  2.5 to 3.5 per Interventional Radiologist    Home dose:  New start    Risk factors or significant drug interactions:  History of alcoholism. Other anticoagulants:  Heparin drip bridge therapy    Daily Monitoring  Date  INR     Warfarin dose  HGB              Notes  7/17  1.2  5 mg   12.2  7/18  1.2  5 mg   12.0  7/19  1.1  2.5 mg + 5 mg  12.0      Pharmacy consulted to start warfarin for IVC thrombus. S/p IVC Venogram.  Suction thrombectomy. Supra-renal IVC Filter retrieval and replacement into the infra-renal IVC. Heme/Onc consulted and workup for hypercoagulable state pending. Per Heme/Onc note, they would be okay transitioning eventually to a NOAC, however documentation from hospitalist states cost of NOAC's is a barrier and is opting for now to treat patient with warfarin. INR today remains unchanged on day 3 of therapy, so will increase dose to 7.5 mg today. In order to get INR trending up more quickly, will give 2.5 mg now and 5 mg this evening for a total of 7.5 mg today. Will follow daily INR for changes. Continue heparin drip as bridge therapy for now. Ideally would continue at least 5 days and until INR therapeutic X 24 hours.     Thank you,  Ángel Rod, PharmD  Clinical Pharmacist  [phone]

## 2018-07-19 NOTE — PROGRESS NOTES
Date of Outreach Update:  Lucy Tanner was seen and assessed. Patient sleeping. Responds to voice. Previous Outreach assessment has been reviewed. There have been no significant clinical changes since the completion of the last dated Outreach assessment. Will continue to follow up per outreach protocol.     Signed By:   Barrett Sunshine    July 19, 2018 1:45 AM

## 2018-07-19 NOTE — PROGRESS NOTES
Interdisciplinary Rounds completed 07/19/18. Nursing, Case Management, Physician and PT present. Plan of care reviewed and updated.

## 2018-07-19 NOTE — PROGRESS NOTES
Date of Outreach Update:  Rachelle Gordo was seen and assessed. Patient sleeping. No acute distress at this time. Previous Outreach assessment has been reviewed. There have been no significant clinical changes since the completion of the last dated Outreach assessment. Will continue to follow up per outreach protocol.     Signed By:   Tammy Tidwell    July 19, 2018 5:05 AM

## 2018-07-20 ENCOUNTER — APPOINTMENT (OUTPATIENT)
Dept: GENERAL RADIOLOGY | Age: 48
DRG: 271 | End: 2018-07-20
Attending: INTERNAL MEDICINE
Payer: SELF-PAY

## 2018-07-20 LAB
ANION GAP SERPL CALC-SCNC: 5 MMOL/L (ref 7–16)
APTT PPP: 80.5 SEC (ref 23.2–35.3)
AT III ACT/NOR PPP CHRO: NORMAL %
AT III AG ACT/NOR PPP IA: NORMAL %
B2 GLYCOPROT1 IGA SER-ACNC: <9 GPI IGA UNITS (ref 0–25)
B2 GLYCOPROT1 IGG SER-ACNC: <9 GPI IGG UNITS (ref 0–20)
B2 GLYCOPROT1 IGM SER-ACNC: 32 GPI IGM UNITS (ref 0–32)
BUN SERPL-MCNC: 5 MG/DL (ref 6–23)
CALCIUM SERPL-MCNC: 8.1 MG/DL (ref 8.3–10.4)
CARDIOLIPIN IGA SER IA-ACNC: <9 APL U/ML (ref 0–11)
CARDIOLIPIN IGG SER IA-ACNC: <9 GPL U/ML (ref 0–14)
CARDIOLIPIN IGM SER IA-ACNC: <9 MPL U/ML (ref 0–12)
CHLORIDE SERPL-SCNC: 108 MMOL/L (ref 98–107)
CO2 SERPL-SCNC: 28 MMOL/L (ref 21–32)
CREAT SERPL-MCNC: 0.88 MG/DL (ref 0.8–1.5)
GLUCOSE SERPL-MCNC: 93 MG/DL (ref 65–100)
HCT VFR BLD AUTO: 35.8 % (ref 41.1–50.3)
HGB BLD-MCNC: 11.8 G/DL (ref 13.6–17.2)
INR PPP: 1.1
LACTATE SERPL-SCNC: 1.1 MMOL/L (ref 0.4–2)
LIPASE SERPL-CCNC: 54 U/L (ref 73–393)
PLATELET # BLD AUTO: 225 K/UL (ref 150–450)
POTASSIUM SERPL-SCNC: 4.1 MMOL/L (ref 3.5–5.1)
PROT C ACT/NOR PPP CHRO: NORMAL %
PROT S FREE AG ACT/NOR PPP IA: NORMAL %
PROTHROMBIN TIME: 14 SEC (ref 11.5–14.5)
SODIUM SERPL-SCNC: 141 MMOL/L (ref 136–145)
WBC # BLD AUTO: 5.3 K/UL (ref 4.3–11.1)

## 2018-07-20 PROCEDURE — 85048 AUTOMATED LEUKOCYTE COUNT: CPT | Performed by: INTERNAL MEDICINE

## 2018-07-20 PROCEDURE — 85018 HEMOGLOBIN: CPT | Performed by: RADIOLOGY

## 2018-07-20 PROCEDURE — 74011250637 HC RX REV CODE- 250/637: Performed by: RADIOLOGY

## 2018-07-20 PROCEDURE — 85049 AUTOMATED PLATELET COUNT: CPT | Performed by: INTERNAL MEDICINE

## 2018-07-20 PROCEDURE — 36415 COLL VENOUS BLD VENIPUNCTURE: CPT | Performed by: RADIOLOGY

## 2018-07-20 PROCEDURE — 85730 THROMBOPLASTIN TIME PARTIAL: CPT | Performed by: RADIOLOGY

## 2018-07-20 PROCEDURE — 85610 PROTHROMBIN TIME: CPT | Performed by: RADIOLOGY

## 2018-07-20 PROCEDURE — 74011250637 HC RX REV CODE- 250/637: Performed by: INTERNAL MEDICINE

## 2018-07-20 PROCEDURE — 77030020263 HC SOL INJ SOD CL0.9% LFCR 1000ML

## 2018-07-20 PROCEDURE — 83605 ASSAY OF LACTIC ACID: CPT | Performed by: INTERNAL MEDICINE

## 2018-07-20 PROCEDURE — 74011250636 HC RX REV CODE- 250/636: Performed by: RADIOLOGY

## 2018-07-20 PROCEDURE — 83690 ASSAY OF LIPASE: CPT | Performed by: INTERNAL MEDICINE

## 2018-07-20 PROCEDURE — 80048 BASIC METABOLIC PNL TOTAL CA: CPT | Performed by: INTERNAL MEDICINE

## 2018-07-20 PROCEDURE — 65270000029 HC RM PRIVATE

## 2018-07-20 PROCEDURE — 74011250637 HC RX REV CODE- 250/637: Performed by: NURSE PRACTITIONER

## 2018-07-20 PROCEDURE — 74022 RADEX COMPL AQT ABD SERIES: CPT

## 2018-07-20 PROCEDURE — 74011250636 HC RX REV CODE- 250/636: Performed by: NURSE PRACTITIONER

## 2018-07-20 RX ORDER — PANTOPRAZOLE SODIUM 40 MG/1
40 TABLET, DELAYED RELEASE ORAL
Status: DISCONTINUED | OUTPATIENT
Start: 2018-07-20 | End: 2018-07-24 | Stop reason: HOSPADM

## 2018-07-20 RX ORDER — WARFARIN SODIUM 5 MG/1
5 TABLET ORAL ONCE
Status: COMPLETED | OUTPATIENT
Start: 2018-07-20 | End: 2018-07-20

## 2018-07-20 RX ORDER — MAG HYDROX/ALUMINUM HYD/SIMETH 200-200-20
30 SUSPENSION, ORAL (FINAL DOSE FORM) ORAL
Status: DISCONTINUED | OUTPATIENT
Start: 2018-07-20 | End: 2018-07-24 | Stop reason: HOSPADM

## 2018-07-20 RX ORDER — MORPHINE SULFATE 2 MG/ML
2 INJECTION, SOLUTION INTRAMUSCULAR; INTRAVENOUS
Status: DISCONTINUED | OUTPATIENT
Start: 2018-07-20 | End: 2018-07-24 | Stop reason: HOSPADM

## 2018-07-20 RX ADMIN — ALUMINUM HYDROXIDE, MAGNESIUM HYDROXIDE, AND SIMETHICONE 30 ML: 200; 200; 20 SUSPENSION ORAL at 11:37

## 2018-07-20 RX ADMIN — OXYCODONE HYDROCHLORIDE AND ACETAMINOPHEN 1 TABLET: 10; 325 TABLET ORAL at 22:14

## 2018-07-20 RX ADMIN — B-COMPLEX W/ C & FOLIC ACID TAB 1 TABLET: TAB at 08:56

## 2018-07-20 RX ADMIN — MORPHINE SULFATE 2 MG: 2 INJECTION, SOLUTION INTRAMUSCULAR; INTRAVENOUS at 06:22

## 2018-07-20 RX ADMIN — Medication 10 ML: at 22:18

## 2018-07-20 RX ADMIN — WARFARIN SODIUM 5 MG: 5 TABLET ORAL at 18:33

## 2018-07-20 RX ADMIN — OXYCODONE HYDROCHLORIDE AND ACETAMINOPHEN 1 TABLET: 10; 325 TABLET ORAL at 10:29

## 2018-07-20 RX ADMIN — Medication 10 ML: at 13:56

## 2018-07-20 RX ADMIN — HEPARIN SODIUM AND DEXTROSE 18 UNITS/KG/HR: 5000; 5 INJECTION INTRAVENOUS at 23:47

## 2018-07-20 RX ADMIN — FOLIC ACID 1 MG: 1 TABLET ORAL at 08:56

## 2018-07-20 RX ADMIN — MORPHINE SULFATE 2 MG: 2 INJECTION, SOLUTION INTRAMUSCULAR; INTRAVENOUS at 01:57

## 2018-07-20 RX ADMIN — MORPHINE SULFATE 2 MG: 2 INJECTION, SOLUTION INTRAMUSCULAR; INTRAVENOUS at 08:56

## 2018-07-20 RX ADMIN — PANTOPRAZOLE SODIUM 40 MG: 40 TABLET, DELAYED RELEASE ORAL at 10:29

## 2018-07-20 RX ADMIN — WARFARIN SODIUM 5 MG: 5 TABLET ORAL at 13:06

## 2018-07-20 RX ADMIN — HEPARIN SODIUM AND DEXTROSE 18 UNITS/KG/HR: 5000; 5 INJECTION INTRAVENOUS at 03:51

## 2018-07-20 RX ADMIN — OXYCODONE HYDROCHLORIDE AND ACETAMINOPHEN 1 TABLET: 10; 325 TABLET ORAL at 18:33

## 2018-07-20 RX ADMIN — ONDANSETRON 4 MG: 2 INJECTION INTRAMUSCULAR; INTRAVENOUS at 22:16

## 2018-07-20 RX ADMIN — Medication 10 ML: at 06:26

## 2018-07-20 RX ADMIN — Medication 100 MG: at 08:56

## 2018-07-20 NOTE — PROGRESS NOTES
Interdisciplinary Rounds completed 07/20/18. Nursing, Case Management, Physician and PT present. Plan of care reviewed and updated.

## 2018-07-20 NOTE — PROGRESS NOTES
Date of Outreach Update:  Cecil Mosley was seen and assessed. MEWS Score: 1 (07/19/18 2312)  Vitals:    07/19/18 1534 07/19/18 1925 07/19/18 2312 07/20/18 0422   BP: 140/73 124/81 130/77 121/80   Pulse: 73 60 (!) 55 (!) 55   Resp: 20 17 17 18   Temp: 98.3 °F (36.8 °C) 98.7 °F (37.1 °C) 98.4 °F (36.9 °C) 98.4 °F (36.9 °C)   SpO2: 98% 95% 97% 93%   Weight:       Height:             Pain Assessment  Pain Intensity 1: 7 (07/20/18 0157)  Pain Location 1: Abdomen  Pain Intervention(s) 1: Medication (see MAR)  Patient Stated Pain Goal: 0      Previous Outreach assessment has been reviewed. There have been no significant clinical changes since the completion of the last dated Outreach assessment. Patient sleeping, respirations even and unlabored, NAD noted at this time. Will continue to follow up per outreach protocol.     Signed By:   Cait Skinner RN    July 20, 2018 4:33 AM

## 2018-07-20 NOTE — PROGRESS NOTES
[facility] CRITICAL CARE OUTREACH NURSE PROGRESS REPORT      SUBJECTIVE: Called to assess patient secondary to recent transfer from ICU. MEWS Score: 1 (07/20/18 8195)  Vitals:    07/20/18 0422 07/20/18 0636 07/20/18 0651 07/20/18 1038   BP: 121/80  115/76 146/83   Pulse: (!) 55  (!) 52 61   Resp: 18 18 20   Temp: 98.4 °F (36.9 °C)  98 °F (36.7 °C) 98.7 °F (37.1 °C)   SpO2: 93%  97% 96%   Weight:  70.8 kg (156 lb)     Height:              LAB DATA:    Recent Labs      07/20/18   1035  07/18/18   0335   NA  141  144   K  4.1  4.2   CL  108*  109*   CO2  28  27   AGAP  5*  8   GLU  93  87   BUN  5*  12   CREA  0.88  0.98   GFRAA  >60  >60   GFRNA  >60  >60   CA  8.1*  7.5*        Recent Labs      07/20/18   0550  07/19/18   0629  07/18/18   0335   WBC   --    --   5.9   HGB  11.8*  12.0*  12.0*   HCT  35.8*  36.7*  37.0*   PLT   --    --   197          OBJECTIVE: On arrival to room, I found patient to be resting quietly. Pain Assessment  Pain Intensity 1: 5 (07/20/18 0754)  Pain Location 1: Abdomen  Pain Intervention(s) 1: Medication (see MAR)  Patient Stated Pain Goal: 0                                 ASSESSMENT:  Patient is alert, able to communicate needs. Heparin gtt infusing. Resting on R side. Respirations even, unlabored. NAD. PLAN:  Will continue to monitor per outreach protocol.

## 2018-07-20 NOTE — PROGRESS NOTES
Hourly rounds complete this shift, no new complaints at this time: bed in low, locked position, call light and bedside table within reach,  all needs met. Will continue to monitor Report to day shift nurse.

## 2018-07-20 NOTE — PROGRESS NOTES
Hospitalist Progress Note    2018  Admit Date: 7/15/2018  2:11 PM   NAME: Sergio Sommer   :  1970   MRN:  [id number]   Attending: Pierre Moritz, MD  PCP:  Anibal Pacheco MD  As per prior Notes:  \"  SUBJECTIVE:   46yo AAM with PMH of Gastric Perforation s/p abdominal surgery , tobacco and Etoh dependence, Liver Hemangiomas, Recent Wt loss presented with abd pain, N+V and 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 Seen by IR and s/p SVC filter and EKOS , transferred to ICU.     : Feels better, no bleeding, BP better, Has been bradycardic but no CP or dizziness. \"      2018- seen- anxious to leave - re-iterates need for cheap meds and assistance\"    2018- pt complaining of abd and cp similar to when he came in     Nursing notes and chart reviewed. Review of Systems negative with exception of pertinent positives noted above. PHYSICAL EXAM     Visit Vitals    /82 (BP 1 Location: Right arm, BP Patient Position: At rest)    Pulse (!) 50    Temp 98.9 °F (37.2 °C)    Resp 20    Ht 5' 8\" (1.727 m)    Wt 70.8 kg (156 lb)    SpO2 96%    BMI 23.72 kg/m2      Temp (24hrs), Av.5 °F (36.9 °C), Min:98 °F (36.7 °C), Max:98.9 °F (37.2 °C)    Oxygen Therapy  O2 Sat (%): 96 % (18 1436)  Pulse via Oximetry: 70 beats per minute (18 1358)  O2 Device: Room air (18 0709)  O2 Flow Rate (L/min): 2 l/min (18 1654)  ETCO2 (mmHg): 40 mmHg (18 1503)    Intake/Output Summary (Last 24 hours) at 18 1805  Last data filed at 18 1711   Gross per 24 hour   Intake             4944 ml   Output             3020 ml   Net             1924 ml         General: No acute distress. Alert.    Head:  AT/NC  Lungs:  CTABL. Heart:  RRR, no murmur, rub, or gallop  Abdomen: Soft, non-distended, non-tender, +bs  Extremities: No cyanosis or clubbing. Neurologic:  No focal deficits. Moves all extremities. Skin:  No Obvious Rash  Psych:  Normal affect. LABS AND STUDIES:  Personally reviewed all labs, meds, and studies for past 24hrs. ASSESSMENT      Active Hospital Problems    Diagnosis Date Noted    IVC thrombosis ([facility] [address] 07/17/2018    Hemangioma of liver 07/17/2018    Excessive weight loss 07/17/2018    Bradycardia, sinus 07/17/2018    Abdominal pain 07/15/2018           PLAN:    · IVC Thrombus: Appreciate IR input s/p SVC Filter and EKOS, continue on hep gtt and bridging to coumadin. · Heme consulted for Thrombosis and suspected Malignancy given Wt loss and suspicious polyp removed recently. Will follow up as an outpatient  · Abdominal - check labs- xray - further workup and mgt based on clinical course. ..  · Pt INR level ok for 2-3 d/w tyson- no need for 2.5-3.5  · On nicotine patch  · Bradycardia: Asymptomatic, EKG is sinus anay, continue to watch on tele  · on IV Folate/thiamine/MVT, DT precautions. Dispo: Continue to bridge to Coumadin with therapeutic INR prior to DC, Cannot afford NOACs. DVT ppx: On hep gtt  Discussed plan with pt who is in agreement. All questions answered.     Signed By: Bobo Prajapati MD     July 20, 2018

## 2018-07-20 NOTE — PROGRESS NOTES
Pt c/o stomach aching pain around his upper abd that pt describes like he feels like he needs to urinate but holding urine and rates pain at a level of 10 from 0-10. Urine output 900 cc since this AM. Dr Codi Ceja notified, pain medicine and Milanta given per [facility]. Pt now reports pain level at 5 and states he feels much better. Pt c/o feeling hot and cold, Nurse checked pt's temp. Orally with 98.0. Pt now off the floor for ordered abd X-ray. Will continue to monitor.

## 2018-07-20 NOTE — PROGRESS NOTES
I discussed the pathology results with Dr Cade Marino. The specimen is fibrinoid debris, c/w old thrombus. No sign of malignancy.     Demetria Saleh MD

## 2018-07-20 NOTE — PROGRESS NOTES
Monitor room called secondary to heart rate dropping to 43 pt lying in bed with eyes closed, opens eyes quickly when I entered room. Denies shortness of breath or difficulty breathing. Pt states his heart rate is always low.  Will notify primary RN

## 2018-07-21 LAB
APTT PPP: 91.2 SEC (ref 23.2–35.3)
HCT VFR BLD AUTO: 36 % (ref 41.1–50.3)
HGB BLD-MCNC: 11.8 G/DL (ref 13.6–17.2)
INR PPP: 1.3
PROTHROMBIN TIME: 15.2 SEC (ref 11.5–14.5)

## 2018-07-21 PROCEDURE — 77030020263 HC SOL INJ SOD CL0.9% LFCR 1000ML

## 2018-07-21 PROCEDURE — 97161 PT EVAL LOW COMPLEX 20 MIN: CPT

## 2018-07-21 PROCEDURE — 85730 THROMBOPLASTIN TIME PARTIAL: CPT | Performed by: RADIOLOGY

## 2018-07-21 PROCEDURE — 74011250637 HC RX REV CODE- 250/637: Performed by: INTERNAL MEDICINE

## 2018-07-21 PROCEDURE — 85610 PROTHROMBIN TIME: CPT | Performed by: RADIOLOGY

## 2018-07-21 PROCEDURE — 65270000029 HC RM PRIVATE

## 2018-07-21 PROCEDURE — 36415 COLL VENOUS BLD VENIPUNCTURE: CPT | Performed by: RADIOLOGY

## 2018-07-21 PROCEDURE — 97530 THERAPEUTIC ACTIVITIES: CPT

## 2018-07-21 PROCEDURE — 74011250637 HC RX REV CODE- 250/637: Performed by: NURSE PRACTITIONER

## 2018-07-21 PROCEDURE — 74011250636 HC RX REV CODE- 250/636: Performed by: RADIOLOGY

## 2018-07-21 PROCEDURE — 74011250636 HC RX REV CODE- 250/636: Performed by: INTERNAL MEDICINE

## 2018-07-21 PROCEDURE — 85018 HEMOGLOBIN: CPT | Performed by: RADIOLOGY

## 2018-07-21 PROCEDURE — 74011250637 HC RX REV CODE- 250/637: Performed by: RADIOLOGY

## 2018-07-21 RX ORDER — POLYETHYLENE GLYCOL 3350 17 G/17G
17 POWDER, FOR SOLUTION ORAL DAILY
Status: DISCONTINUED | OUTPATIENT
Start: 2018-07-21 | End: 2018-07-23

## 2018-07-21 RX ADMIN — Medication 10 ML: at 22:15

## 2018-07-21 RX ADMIN — HEPARIN SODIUM AND DEXTROSE 18 UNITS/KG/HR: 5000; 5 INJECTION INTRAVENOUS at 18:24

## 2018-07-21 RX ADMIN — FOLIC ACID 1 MG: 1 TABLET ORAL at 09:02

## 2018-07-21 RX ADMIN — Medication 5 ML: at 06:06

## 2018-07-21 RX ADMIN — PANTOPRAZOLE SODIUM 40 MG: 40 TABLET, DELAYED RELEASE ORAL at 06:05

## 2018-07-21 RX ADMIN — OXYCODONE HYDROCHLORIDE AND ACETAMINOPHEN 1 TABLET: 10; 325 TABLET ORAL at 03:01

## 2018-07-21 RX ADMIN — WARFARIN SODIUM 7.5 MG: 5 TABLET ORAL at 17:04

## 2018-07-21 RX ADMIN — SODIUM CHLORIDE 100 ML/HR: 900 INJECTION, SOLUTION INTRAVENOUS at 06:05

## 2018-07-21 RX ADMIN — B-COMPLEX W/ C & FOLIC ACID TAB 1 TABLET: TAB at 09:02

## 2018-07-21 RX ADMIN — OXYCODONE HYDROCHLORIDE AND ACETAMINOPHEN 1 TABLET: 10; 325 TABLET ORAL at 09:02

## 2018-07-21 RX ADMIN — HEPARIN SODIUM AND DEXTROSE 18 UNITS/KG/HR: 5000; 5 INJECTION INTRAVENOUS at 19:01

## 2018-07-21 RX ADMIN — POLYETHYLENE GLYCOL (3350) 17 G: 17 POWDER, FOR SOLUTION ORAL at 16:14

## 2018-07-21 RX ADMIN — SODIUM CHLORIDE 100 ML/HR: 900 INJECTION, SOLUTION INTRAVENOUS at 16:14

## 2018-07-21 RX ADMIN — Medication 10 ML: at 14:00

## 2018-07-21 RX ADMIN — OXYCODONE HYDROCHLORIDE AND ACETAMINOPHEN 1 TABLET: 10; 325 TABLET ORAL at 22:15

## 2018-07-21 RX ADMIN — Medication 100 MG: at 09:02

## 2018-07-21 NOTE — PROGRESS NOTES
Hourly rounds completed this shift. Patient resting in bed. No needs stated at this time. Will continue to monitor and give report to oncoming RN.

## 2018-07-21 NOTE — PROGRESS NOTES
Uneventful shift. Hourly rounds completed throughout shift. Received pain medication x 2 this shift. Having severe abd pain. Relieved by pain medication. Patient denies needs at this time. Will continue to monitor and give bedside report to oncoming day shift nurse.

## 2018-07-21 NOTE — PROGRESS NOTES
Problem: Mobility Impaired (Adult and Pediatric)  Goal: *Therapy Goal (Edit Goal, Insert Text)  1. Assurance of patient safety through therapeutic activities. 1 visit. PHYSICAL THERAPY: Initial Assessment, Treatment Day: Day of Assessment 7/21/2018  INPATIENT: Hospital Day: 7  Payor: SELF PAY / Plan: [facility] SELF PAY / Product Type: Self Pay /      NAME/AGE/GENDER: Laura Henriquez is a 50 y.o. male   PRIMARY DIAGNOSIS: Abdominal pain IVC thrombosis ([address] IVC thrombosis ([address]        ICD-10: Treatment Diagnosis:    · Difficulty in walking, Not elsewhere classified (R26.2)   Precaution/Allergies:  Review of patient's allergies indicates no known allergies. ASSESSMENT:     Mr. Fifi Cerda presents with great improvement in ambulation and mobility. All transfers are independent. Therapeutic activities are incorporated for safe mobility. He ambulates for 250 feet in the esparza and room. He returns to room independently. No additional skilled PT is indicated at this time. This section established at most recent assessment   PROBLEM LIST (Impairments causing functional limitations):  1. Decreased Transfer Abilities   INTERVENTIONS PLANNED: (Benefits and precautions of physical therapy have been discussed with the patient.)  1. Therapeutic Activites     TREATMENT PLAN: Frequency/Duration: no additional PT is indicated at this time. Rehabilitation Potential For Stated Goals: Excellent     RECOMMENDED REHABILITATION/EQUIPMENT: (at time of discharge pending progress): Due to the probability of continued deficits (see above) this patient will not likely need continued skilled physical therapy after discharge.   Equipment:    None at this time              HISTORY:   History of Present Injury/Illness (Reason for Referral):  PER MD H&P   Patient is a 46yo  AAM with PMH of Gastric perforation with abdominal surgery 2014, recent colonoscopy with polyp removal, smokes 1/2 pack day cigarettes, and drinks 4-6 beers per day, who came into the ER with complaints of 10/10 abdominal pain, associated with nausea, vomiting, chills/sweats  x 4 days. Patient reports lower back pain and BM this morning. Denies diarrhea, CP, SOB, HA, or fever. CT findings of large thrombus within the IVC, possibly extending into iliac veins. Vascular and IR contacted by ER. Heparin drip started. UA positive and rocephin started. WBC 4.3. Lipase 34. BP  151/101. Patient admitted to  remote telemetry  Past Medical History/Comorbidities:   Mr. Eddi Batista  has a past medical history of Chronic kidney disease. Mr. Eddi Batista  has a past surgical history that includes hx orthopaedic (Left) and hx gi (10/3/14). Social History/Living Environment:   Home Environment: Trailer/mobile home  # Steps to Enter: 1 (1)  One/Two Story Residence: One story  Living Alone: No  Support Systems: Family member(s)  Patient Expects to be Discharged to[de-identified] Trailer/mobile home  Current DME Used/Available at Home: Other (comment) (none)  Prior Level of Function/Work/Activity:  Patient had been independent with all functional mobility. Dominant Side:         RIGHT  Personal Factors:          Sex:  male        Age:  50 y.o. Number of Personal Factors/Comorbidities that affect the Plan of Care: 0: LOW COMPLEXITY   EXAMINATION:   Most Recent Physical Functioning:   Gross Assessment:  AROM: Within functional limits  PROM: Within functional limits  Strength:  Within functional limits  Coordination: Within functional limits  Tone: Normal  Sensation: Intact               Posture:  Posture (WDL): Exceptions to WDL  Posture Assessment: Cervical, Forward head  Balance:  Sitting: Intact  Standing: Intact Bed Mobility:  Rolling: Independent  Supine to Sit: Independent  Sit to Supine: Independent  Scooting: Independent  Wheelchair Mobility:     Transfers:  Sit to Stand: Independent  Stand to Sit: Independent  Bed to Chair: Independent  Gait:     Base of Support: Center of gravity altered  Stance: Weight shift  Gait Abnormalities: Decreased step clearance  Distance (ft): 250 Feet (ft)  Ambulation - Level of Assistance: Independent  Interventions: Safety awareness training      Body Structures Involved:  1. Joints  2. Muscles  3. Ligaments Body Functions Affected:  1. Neuromusculoskeletal  2. Movement Related  3. Skin Related Activities and Participation Affected:  1. General Tasks and Demands  2. Communication  3. Mobility   Number of elements that affect the Plan of Care: 1-2: LOW COMPLEXITY   CLINICAL PRESENTATION:   Presentation: Stable and uncomplicated: LOW COMPLEXITY   CLINICAL DECISION MAKIN[address] [place] [address] AM-PAC 6 Clicks   Basic Mobility Inpatient Short Form  How much difficulty does the patient currently have. .. Unable A Lot A Little None   1. Turning over in bed (including adjusting bedclothes, sheets and blankets)? [] 1   [] 2   [] 3   [x] 4   2. Sitting down on and standing up from a chair with arms ( e.g., wheelchair, bedside commode, etc.)   [] 1   [] 2   [] 3   [x] 4   3. Moving from lying on back to sitting on the side of the bed? [] 1   [] 2   [] 3   [x] 4   How much help from another person does the patient currently need. .. Total A Lot A Little None   4. Moving to and from a bed to a chair (including a wheelchair)? [] 1   [] 2   [] 3   [x] 4   5. Need to walk in hospital room? [] 1   [] 2   [] 3   [x] 4   6. Climbing 3-5 steps with a railing? [] 1   [] 2   [] 3   [x] 4   © , Trustees of [address] [place] [address], under license to [employer]. All rights reserved      Score:  Initial: 24 Most Recent: X (Date: -- )    Interpretation of Tool:  Represents activities that are increasingly more difficult (i.e. Bed mobility, Transfers, Gait). Score 24 23 22-20 19-15 14-10 9-7 6     Modifier CH CI CJ CK CL CM CN      ?  Mobility - Walking and Moving Around:     - CURRENT STATUS: CH - 0% impaired, limited or restricted    - GOAL STATUS: CH - 0% impaired, limited or restricted    - D/C STATUS:  [address] - 0% impaired, limited or restricted  Payor: SELF PAY / [facility] / Product Type: Self Pay /      Medical Necessity:     · Patient demonstrates excellent rehab potential due to higher previous functional level. Reason for Services/Other Comments:  · no additional PT is indicated for this patient at this time. .   Use of outcome tool(s) and clinical judgement create a POC that gives a: Clear prediction of patient's progress: LOW COMPLEXITY            TREATMENT:   (In addition to Assessment/Re-Assessment sessions the following treatments were rendered)   Pre-treatment Symptoms/Complaints:  0/10   Pain: Initial:   Pain Intensity 1: 0  Post Session:  0/10        Therapeutic Activity: (    8 mins): Therapeutic activities including Bed transfers, Chair transfers and Ambulation on level ground to improve mobility and safety training. [place] Required minimal Safety awareness training to promote motor control of bilateral, lower extremity(s). Braces/Orthotics/Lines/Etc:   · O2 Device: Room air  Treatment/Session Assessment:    · Response to Treatment:  No additional PT indicated at this time. · Interdisciplinary Collaboration:   o Physical Therapist  · After treatment position/precautions:   o Supine in bed  o Bed alarm/tab alert on  o Bed/Chair-wheels locked  o Call light within reach  o Family at bedside   · Compliance with Program/Exercises: compliant all of the time. · Recommendations/Intent for next treatment session:  No additional PT is indicated at this time.     Total Treatment Duration:  PT Patient Time In/Time Out  Time In: 200  Time Out: 3 Gracie Boone PT

## 2018-07-21 NOTE — PROGRESS NOTES
Warfarin dosing per pharmacist    Angelo Hess is a 50 y.o. male. Height: 5' 8\" (172.7 cm)    Weight: 70.3 kg (155 lb)    Indication:  IVC thrombus    Goal INR:  2.0 to 3.0 per hospitalist    Home dose:  New start    Risk factors or significant drug interactions:  History of alcoholism. Other anticoagulants:  Heparin drip bridge therapy    Daily Monitoring  Date  INR     Warfarin dose  HGB              Notes  7/17  1.2  5 mg   12.2  7/18  1.2  5 mg   12.0  7/19  1.1  2.5 mg + 5 mg  12.0  7/20  1.1  5 mg + 5 mg  11.8  7/21  1.3  7.5 mg   11.8      Pharmacy consulted to start warfarin for IVC thrombus. S/p IVC Venogram.  Suction thrombectomy. Supra-renal IVC Filter retrieval and replacement into the infra-renal IVC. Heme/Onc consulted and workup for hypercoagulable state pending. Per Heme/Onc note, they would be okay transitioning eventually to a NOAC, however documentation from hospitalist states cost of NOAC's is a barrier and is opting for now to treat patient with warfarin. INR today still subtherapeutic at 1.3. Will give 7.5 mg tonight. Will follow daily INR for changes. Continue heparin drip as bridge therapy for now. Ideally would continue at least 5 days and until INR therapeutic X 24 hours.     Thank you,  Claudia Schmidt, PharmD  Clinical Pharmacist  [phone]

## 2018-07-22 LAB
APTT PPP: 86.6 SEC (ref 23.2–35.3)
HCT VFR BLD AUTO: 34 % (ref 41.1–50.3)
HGB BLD-MCNC: 11.4 G/DL (ref 13.6–17.2)
INR PPP: 1.3
PROTHROMBIN TIME: 16 SEC (ref 11.5–14.5)

## 2018-07-22 PROCEDURE — 85018 HEMOGLOBIN: CPT | Performed by: INTERNAL MEDICINE

## 2018-07-22 PROCEDURE — 65270000029 HC RM PRIVATE

## 2018-07-22 PROCEDURE — 77030020263 HC SOL INJ SOD CL0.9% LFCR 1000ML

## 2018-07-22 PROCEDURE — 74011250637 HC RX REV CODE- 250/637: Performed by: NURSE PRACTITIONER

## 2018-07-22 PROCEDURE — 85730 THROMBOPLASTIN TIME PARTIAL: CPT | Performed by: RADIOLOGY

## 2018-07-22 PROCEDURE — 74011250637 HC RX REV CODE- 250/637: Performed by: RADIOLOGY

## 2018-07-22 PROCEDURE — 85610 PROTHROMBIN TIME: CPT | Performed by: RADIOLOGY

## 2018-07-22 PROCEDURE — 74011250637 HC RX REV CODE- 250/637: Performed by: INTERNAL MEDICINE

## 2018-07-22 PROCEDURE — 36415 COLL VENOUS BLD VENIPUNCTURE: CPT | Performed by: RADIOLOGY

## 2018-07-22 PROCEDURE — 74011250636 HC RX REV CODE- 250/636: Performed by: RADIOLOGY

## 2018-07-22 RX ORDER — PREDNISONE 20 MG/1
20 TABLET ORAL
Status: DISCONTINUED | OUTPATIENT
Start: 2018-07-23 | End: 2018-07-22

## 2018-07-22 RX ORDER — PETROLATUM 42 G/100G
OINTMENT TOPICAL AS NEEDED
Status: DISCONTINUED | OUTPATIENT
Start: 2018-07-22 | End: 2018-07-22

## 2018-07-22 RX ORDER — DIPHENHYDRAMINE HCL 25 MG
25 CAPSULE ORAL
Status: DISCONTINUED | OUTPATIENT
Start: 2018-07-22 | End: 2018-07-24 | Stop reason: HOSPADM

## 2018-07-22 RX ORDER — WARFARIN SODIUM 5 MG/1
10 TABLET ORAL EVERY EVENING
Status: DISCONTINUED | OUTPATIENT
Start: 2018-07-22 | End: 2018-07-23

## 2018-07-22 RX ORDER — DIAPER,BRIEF,INFANT-TODD,DISP
EACH MISCELLANEOUS 2 TIMES DAILY
Status: DISCONTINUED | OUTPATIENT
Start: 2018-07-22 | End: 2018-07-24 | Stop reason: HOSPADM

## 2018-07-22 RX ADMIN — SODIUM CHLORIDE 100 ML/HR: 900 INJECTION, SOLUTION INTRAVENOUS at 01:24

## 2018-07-22 RX ADMIN — HEPARIN SODIUM AND DEXTROSE 18 UNITS/KG/HR: 5000; 5 INJECTION INTRAVENOUS at 14:22

## 2018-07-22 RX ADMIN — Medication 100 MG: at 08:06

## 2018-07-22 RX ADMIN — HEPARIN SODIUM AND DEXTROSE 18 UNITS/KG/HR: 5000; 5 INJECTION INTRAVENOUS at 06:55

## 2018-07-22 RX ADMIN — Medication 10 ML: at 05:41

## 2018-07-22 RX ADMIN — OXYCODONE HYDROCHLORIDE AND ACETAMINOPHEN 1 TABLET: 10; 325 TABLET ORAL at 07:35

## 2018-07-22 RX ADMIN — OXYCODONE HYDROCHLORIDE AND ACETAMINOPHEN 1 TABLET: 10; 325 TABLET ORAL at 22:18

## 2018-07-22 RX ADMIN — OXYCODONE HYDROCHLORIDE AND ACETAMINOPHEN 1 TABLET: 10; 325 TABLET ORAL at 16:42

## 2018-07-22 RX ADMIN — HYDROCORTISONE: 5 CREAM TOPICAL at 22:17

## 2018-07-22 RX ADMIN — WARFARIN SODIUM 10 MG: 5 TABLET ORAL at 18:27

## 2018-07-22 RX ADMIN — Medication 10 ML: at 22:19

## 2018-07-22 RX ADMIN — Medication 10 ML: at 14:27

## 2018-07-22 RX ADMIN — SODIUM CHLORIDE 100 ML/HR: 900 INJECTION, SOLUTION INTRAVENOUS at 11:34

## 2018-07-22 RX ADMIN — POLYETHYLENE GLYCOL (3350) 17 G: 17 POWDER, FOR SOLUTION ORAL at 08:06

## 2018-07-22 RX ADMIN — OXYCODONE HYDROCHLORIDE AND ACETAMINOPHEN 1 TABLET: 10; 325 TABLET ORAL at 01:41

## 2018-07-22 RX ADMIN — DIPHENHYDRAMINE HYDROCHLORIDE 25 MG: 25 CAPSULE ORAL at 17:01

## 2018-07-22 RX ADMIN — FOLIC ACID 1 MG: 1 TABLET ORAL at 08:05

## 2018-07-22 RX ADMIN — B-COMPLEX W/ C & FOLIC ACID TAB 1 TABLET: TAB at 08:09

## 2018-07-22 RX ADMIN — PANTOPRAZOLE SODIUM 40 MG: 40 TABLET, DELAYED RELEASE ORAL at 06:55

## 2018-07-22 NOTE — PROGRESS NOTES
Hourly rounds complete this shift, , patient c/o: bed in low, locked position, call light and bedside table within reach,  all needs met. Will continue to monitor Report to day shift nurse.

## 2018-07-22 NOTE — PROGRESS NOTES
Warfarin dosing per pharmacist    Bernardo Hou is a 50 y.o. male. Height: 5' 8\" (172.7 cm)    Weight: 72.8 kg (160 lb 9.6 oz)    Indication:  IVC thrombus    Goal INR:  2.0 to 3.0 per hospitalist    Home dose:  New start    Risk factors or significant drug interactions:  History of alcoholism. Other anticoagulants:  Heparin drip bridge therapy    Daily Monitoring  Date  INR     Warfarin dose  HGB              Notes  7/17  1.2  5 mg   12.2  7/18  1.2  5 mg   12.0  7/19  1.1  2.5 mg + 5 mg  12.0  7/20  1.1  5 mg + 5 mg  11.8  7/21  1.3  7.5 mg   11.8  7/22  1.3  10 mg   ---      Pharmacy consulted to start warfarin for IVC thrombus. S/p IVC Venogram.  Suction thrombectomy. Supra-renal IVC Filter retrieval and replacement into the infra-renal IVC. Heme/Onc consulted and workup for hypercoagulable state pending. Per Heme/Onc note, they would be okay transitioning eventually to a NOAC, however documentation from hospitalist states cost of NOAC's is a barrier and is opting for now to treat patient with warfarin. INR today still subtherapeutic at 1.3. Will give 7.5 mg tonight. Will follow daily INR for changes. Continue heparin drip as bridge therapy for now. Ideally would continue at least 5 days and until INR therapeutic X 24 hours.     Thank you,  Ezio Cunningham, PharmD  Clinical Pharmacist  [phone]

## 2018-07-22 NOTE — PROGRESS NOTES
Hospitalist Progress Note    2018  Admit Date: 7/15/2018  2:11 PM   NAME: Thuan Curry   :  1970   MRN:  [id number]   Attending: Nancy Bella MD  PCP:  Frank Cranker, MD  As per prior Notes:  \"  SUBJECTIVE:   48yo AAM with PMH of Gastric Perforation s/p abdominal surgery , tobacco and Etoh dependence, Liver Hemangiomas, Recent Wt loss presented with abd pain, N+V and 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 Seen by IR and s/p SVC filter and EKOS , transferred to ICU.     : Feels better, no bleeding, BP better, Has been bradycardic but no CP or dizziness. \"      2018- seen- anxious to leave - re-iterates need for cheap meds and assistance\"    2018- pt complaining of abd and cp similar to when he came in     2018- seen inr 1.3 today. Abdominal pain resolved and more associated with eating per him. Wants to make sure he gets pain meds on discharge and that he can get help with the medications      2018- pt seen - inr 1.3     Nursing notes and chart reviewed. Review of Systems negative with exception of pertinent positives noted above. PHYSICAL EXAM     Visit Vitals    /72 (BP 1 Location: Right arm, BP Patient Position: At rest)    Pulse (!) 58    Temp 98.3 °F (36.8 °C)    Resp 20    Ht 5' 8\" (1.727 m)    Wt 72.8 kg (160 lb 9.6 oz)    SpO2 96%    BMI 24.42 kg/m2      Temp (24hrs), Av.2 °F (36.8 °C), Min:97.8 °F (36.6 °C), Max:98.3 °F (36.8 °C)    Oxygen Therapy  O2 Sat (%): 96 % (18 1457)  Pulse via Oximetry: 70 beats per minute (18 1358)  O2 Device: Room air (18 0709)  O2 Flow Rate (L/min): 2 l/min (18 1654)  ETCO2 (mmHg): 40 mmHg (18 1503)    Intake/Output Summary (Last 24 hours) at 18 1729  Last data filed at 18 1644   Gross per 24 hour   Intake           5749.4 ml   Output             2200 ml   Net           3549.4 ml         General: No acute distress. Alert.    Head:  AT/NC  Lungs:  CTABL. Heart:  RRR, no murmur, rub, or gallop  Abdomen: Soft, non-distended, non-tender, +bs  Extremities: No cyanosis or clubbing. Neurologic:  No focal deficits. Moves all extremities. Skin:  No Obvious Rash  Psych:  Normal affect. LABS AND STUDIES:  Personally reviewed all labs, meds, and studies for past 24hrs. ASSESSMENT      Active Hospital Problems    Diagnosis Date Noted    IVC thrombosis ([facility] [address] [address] 07/17/2018    Hemangioma of liver 07/17/2018    Excessive weight loss 07/17/2018    Bradycardia, sinus 07/17/2018    Abdominal pain 07/15/2018           PLAN:    · IVC Thrombus: s/p  IR placing an  SVC Filter and EKOS, continue on hep gtt and bridging to coumadin. · Heme consulted for Thrombosis and suspected Malignancy given Wt loss and suspicious polyp removed recently. Will follow up as an outpatient  · Abdominal pain- resolved at this time - w/up yesterday negative suspect dyspepsia as well as some anxiety  · Pt INR level ok for 2-3 d/w tyson- no need for 2.5-3.5  · On nicotine patch  · Bradycardia: Asymptomatic, EKG is sinus anay, continue to watch on tele  · on IV Folate/thiamine/MVT, DT precautions. · Rash to abdomen - pt states new but wife states it comes and goes for years, - benadryl,  low dose steroid cream     Dispo: Continue to bridge to Coumadin with therapeutic INR prior to DC, Cannot afford NOACs. DVT ppx: On hep gtt  Discussed plan with pt who is in agreement. All questions answered.     Signed By: Sury Flanagan MD     July 22, 2018

## 2018-07-22 NOTE — PROGRESS NOTES
Patient C/O abdominal rash and itching. There is a small rash noted on patient's lower abdomen. Dr. Keli Mendiola notified and ordered 25 mg of  PO benadryl Q6 as needed for itching. Will continue to monitor patient.

## 2018-07-23 LAB
APTT PPP: 66.2 SEC (ref 23.2–35.3)
APTT PPP: 89.4 SEC (ref 23.2–35.3)
HCT VFR BLD AUTO: 35.2 % (ref 41.1–50.3)
HGB BLD-MCNC: 11.4 G/DL (ref 13.6–17.2)
INR PPP: 1.3
PROTHROMBIN TIME: 15.9 SEC (ref 11.5–14.5)

## 2018-07-23 PROCEDURE — 74011250637 HC RX REV CODE- 250/637: Performed by: RADIOLOGY

## 2018-07-23 PROCEDURE — 85730 THROMBOPLASTIN TIME PARTIAL: CPT | Performed by: RADIOLOGY

## 2018-07-23 PROCEDURE — 74011250637 HC RX REV CODE- 250/637: Performed by: INTERNAL MEDICINE

## 2018-07-23 PROCEDURE — 74011250637 HC RX REV CODE- 250/637: Performed by: NURSE PRACTITIONER

## 2018-07-23 PROCEDURE — 77030020263 HC SOL INJ SOD CL0.9% LFCR 1000ML

## 2018-07-23 PROCEDURE — 85610 PROTHROMBIN TIME: CPT | Performed by: RADIOLOGY

## 2018-07-23 PROCEDURE — 36415 COLL VENOUS BLD VENIPUNCTURE: CPT | Performed by: RADIOLOGY

## 2018-07-23 PROCEDURE — 85018 HEMOGLOBIN: CPT | Performed by: RADIOLOGY

## 2018-07-23 PROCEDURE — 65270000029 HC RM PRIVATE

## 2018-07-23 PROCEDURE — 74011250636 HC RX REV CODE- 250/636: Performed by: RADIOLOGY

## 2018-07-23 PROCEDURE — 85730 THROMBOPLASTIN TIME PARTIAL: CPT | Performed by: INTERNAL MEDICINE

## 2018-07-23 PROCEDURE — 74011250636 HC RX REV CODE- 250/636: Performed by: INTERNAL MEDICINE

## 2018-07-23 RX ORDER — HEPARIN SODIUM 5000 [USP'U]/ML
35 INJECTION, SOLUTION INTRAVENOUS; SUBCUTANEOUS
Status: COMPLETED | OUTPATIENT
Start: 2018-07-23 | End: 2018-07-23

## 2018-07-23 RX ORDER — AMOXICILLIN 250 MG
1 CAPSULE ORAL DAILY
Status: DISCONTINUED | OUTPATIENT
Start: 2018-07-24 | End: 2018-07-24 | Stop reason: HOSPADM

## 2018-07-23 RX ORDER — POLYETHYLENE GLYCOL 3350 17 G/17G
17 POWDER, FOR SOLUTION ORAL 2 TIMES DAILY
Status: DISCONTINUED | OUTPATIENT
Start: 2018-07-23 | End: 2018-07-24 | Stop reason: HOSPADM

## 2018-07-23 RX ADMIN — HEPARIN SODIUM AND DEXTROSE 20 UNITS/KG/HR: 5000; 5 INJECTION INTRAVENOUS at 07:15

## 2018-07-23 RX ADMIN — POLYETHYLENE GLYCOL (3350) 17 G: 17 POWDER, FOR SOLUTION ORAL at 08:29

## 2018-07-23 RX ADMIN — Medication 10 ML: at 21:27

## 2018-07-23 RX ADMIN — HYDROCORTISONE: 5 CREAM TOPICAL at 17:36

## 2018-07-23 RX ADMIN — ZOLPIDEM TARTRATE 5 MG: 5 TABLET ORAL at 23:45

## 2018-07-23 RX ADMIN — SODIUM CHLORIDE 100 ML/HR: 900 INJECTION, SOLUTION INTRAVENOUS at 21:27

## 2018-07-23 RX ADMIN — HEPARIN SODIUM AND DEXTROSE 20 UNITS/KG/HR: 5000; 5 INJECTION INTRAVENOUS at 09:33

## 2018-07-23 RX ADMIN — Medication 10 ML: at 13:03

## 2018-07-23 RX ADMIN — Medication 100 MG: at 08:28

## 2018-07-23 RX ADMIN — SODIUM CHLORIDE 100 ML/HR: 900 INJECTION, SOLUTION INTRAVENOUS at 07:08

## 2018-07-23 RX ADMIN — FOLIC ACID 1 MG: 1 TABLET ORAL at 08:28

## 2018-07-23 RX ADMIN — HEPARIN SODIUM 2550 UNITS: 5000 INJECTION, SOLUTION INTRAVENOUS; SUBCUTANEOUS at 07:19

## 2018-07-23 RX ADMIN — OXYCODONE HYDROCHLORIDE AND ACETAMINOPHEN 1 TABLET: 10; 325 TABLET ORAL at 11:40

## 2018-07-23 RX ADMIN — B-COMPLEX W/ C & FOLIC ACID TAB 1 TABLET: TAB at 08:28

## 2018-07-23 RX ADMIN — WARFARIN SODIUM 7.5 MG: 5 TABLET ORAL at 13:03

## 2018-07-23 RX ADMIN — Medication 10 ML: at 05:55

## 2018-07-23 RX ADMIN — POLYETHYLENE GLYCOL (3350) 17 G: 17 POWDER, FOR SOLUTION ORAL at 17:28

## 2018-07-23 RX ADMIN — PANTOPRAZOLE SODIUM 40 MG: 40 TABLET, DELAYED RELEASE ORAL at 05:55

## 2018-07-23 RX ADMIN — OXYCODONE HYDROCHLORIDE AND ACETAMINOPHEN 1 TABLET: 10; 325 TABLET ORAL at 20:35

## 2018-07-23 RX ADMIN — OXYCODONE HYDROCHLORIDE AND ACETAMINOPHEN 1 TABLET: 10; 325 TABLET ORAL at 03:31

## 2018-07-23 RX ADMIN — HYDROCORTISONE: 5 CREAM TOPICAL at 08:29

## 2018-07-23 RX ADMIN — WARFARIN SODIUM 7.5 MG: 5 TABLET ORAL at 17:38

## 2018-07-23 NOTE — PROGRESS NOTES
Interdisciplinary Rounds completed 07/23/18. Nursing, Case Management, Physician and PT present. Plan of care reviewed and updated.

## 2018-07-23 NOTE — PROGRESS NOTES
Hospitalist Progress Note    2018  Admit Date: 7/15/2018  2:11 PM   NAME: Kaykay Hayes   :  1970   MRN:  [id number]   Attending: Shamika Gifford MD  PCP:  Raghu Rosales MD  As per prior Notes:  \"  SUBJECTIVE:   46yo AAM with PMH of Gastric Perforation s/p abdominal surgery , tobacco and Etoh dependence, Liver Hemangiomas, Recent Wt loss presented with abd pain, N+V and 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 Seen by IR and s/p SVC filter and EKOS , transferred to ICU.     : Feels better, no bleeding, BP better, Has been bradycardic but no CP or dizziness. \"        The pt was transferred from ICU to remote telemetry- waiting on INR to be therapeutic. He cannot afford NOAC. He has some anxiety and will often complain of pain similar to when he came in - His H&H has remained stable. He also likes to have BM's everyday and will request aids. He also complained of a rash to abdomen yesterday that he insisted was new bt wife came and states it comes and goes all the time for years. Looks like eczema and we started a low dose hydrocortisone cream.        2018- pt seen - inr 1.3     Nursing notes and chart reviewed. Review of Systems negative with exception of pertinent positives noted above.     PHYSICAL EXAM     Visit Vitals    /75    Pulse 73    Temp 97.9 °F (36.6 °C)    Resp 16    Ht 5' 8\" (1.727 m)    Wt 73 kg (160 lb 15 oz)    SpO2 98%    BMI 24.47 kg/m2      Temp (24hrs), Av.2 °F (36.8 °C), Min:97.9 °F (36.6 °C), Max:98.8 °F (37.1 °C)    Oxygen Therapy  O2 Sat (%): 98 % (18 1143)  Pulse via Oximetry: 70 beats per minute (18 1358)  O2 Device: Room air (18 0709)  O2 Flow Rate (L/min): 2 l/min (18 1654)  ETCO2 (mmHg): 40 mmHg (18 1503)    Intake/Output Summary (Last 24 hours) at 18 1250  Last data filed at 18 1144   Gross per 24 hour   Intake           4617.4 ml   Output             2975 ml   Net           1642.4 ml         General: No acute distress. Alert.    Head:  AT/NC  Lungs:  CTABL. Heart:  RRR, no murmur, rub, or gallop  Abdomen: Soft, non-distended, non-tender, +bs  Extremities: No cyanosis or clubbing. Neurologic:  No focal deficits. Moves all extremities. Skin:  No Obvious Rash  Psych:  Normal affect. LABS AND STUDIES:  Personally reviewed all labs, meds, and studies for past 24hrs. ASSESSMENT      Active Hospital Problems    Diagnosis Date Noted    [address]C thrombosis ([facility] [address] 07/17/2018    Hemangioma of liver 07/17/2018    Excessive weight loss 07/17/2018    Bradycardia, sinus 07/17/2018    Abdominal pain 07/15/2018           PLAN:    · IVC Thrombus: s/p  IR placing an  SVC Filter and EKOS, continue on hep gtt and bridging to coumadin. · Heme consulted for Thrombosis and suspected Malignancy given Wt loss and suspicious polyp removed recently. Will follow up as an outpatient  · Abdominal pain- resolved at this time - w/up negative suspect dyspepsia as well as some anxiety; hb stable  · Pt INR level ok. for 2-3 d/w tyson  · On nicotine patch  · Bradycardia: Asymptomatic, EKG is sinus anay, continue to watch on tele  · on IV Folate/thiamine/MVT, DT precautions. · Rash to abdomen - pt states new but wife states it comes and goes for years, - benadryl,  low dose steroid cream     Dispo: Continue to bridge to Coumadin with therapeutic INR prior to DC, Cannot afford NOACs. DVT ppx: On hep gtt  Discussed plan with pt who is in agreement. All questions answered.     Signed By: Obdulio Mejía MD     July 23, 2018

## 2018-07-23 NOTE — PROGRESS NOTES
During caring rounds, the Pt came out of the bathroom with the room smelling like cigarette smoke. The Pt admitted to have smoked one cigarette in the bathroom. This nurse educated the Pt on the non-smoking policies in place at this hospital. Pt replied stating, \"Let's keep this between me and you, I won't do it no more. \" Charge RN, Lostine, made aware. Will continue to monitor at this time.

## 2018-07-23 NOTE — PROGRESS NOTES
Hourly rounds completed and all needs were met at this time. .   Pt c/o pain, pain medication given per MAR. Will continue to monitor and report to oncoming nurse.

## 2018-07-23 NOTE — PROGRESS NOTES
Warfarin dosing per pharmacist    Sergio Sommer is a 50 y.o. male. Height: 5' 8\" (172.7 cm)    Weight: 73 kg (160 lb 15 oz)    Indication:  IVC thrombus    Goal INR:  2.0 to 3.0 per hospitalist    Home dose:  New start    Risk factors or significant drug interactions:  History of alcoholism. Other anticoagulants:  Heparin drip bridge therapy    Daily Monitoring  Date  INR     Warfarin dose  HGB              Notes  7/17  1.2  5 mg   12.2  7/18  1.2  5 mg   12.0  7/19  1.1  2.5 mg + 5 mg  12.0  7/20  1.1  5 mg + 5 mg  11.8  7/21  1.3  7.5 mg   11.8  7/22  1.3  10 mg   ---  7/23  1.3  7.5 mg + 7.5 mg 11.4      Pharmacy consulted to start warfarin for IVC thrombus. S/p IVC Venogram.  Suction thrombectomy. Supra-renal IVC Filter retrieval and replacement into the infra-renal IVC. Heme/Onc consulted and workup for hypercoagulable state pending. Per Heme/Onc note, they would be okay transitioning eventually to a NOAC, however documentation from hospitalist states cost of NOAC's is a barrier and is opting for now to treat patient with warfarin. INR today still stagnant and remains subtherapeutic at 1.3. Suspect patient may be genetically a fast metabolizer of warfarin requiring higher than usual doses to achieve therapeutic INR given the current trend in the INR in response to doses given. Will increase dose to 15 mg today. Will give 7.5 mg now and 7.5 mg this evening in an effort to get INR trending up more quickly. Depending on trend in the INR tomorrow, might continue with 12 mg daily starting tomorrow. Continue to follow the INR daily. Continue heparin drip as bridge therapy for now. Ideally would continue at least 5 days and until INR therapeutic X 24 hours.     Thank you,  Kristy Maguire, PharmD  Clinical Pharmacist  [phone]

## 2018-07-24 VITALS
TEMPERATURE: 98.4 F | WEIGHT: 160.94 LBS | HEIGHT: 68 IN | DIASTOLIC BLOOD PRESSURE: 87 MMHG | RESPIRATION RATE: 14 BRPM | BODY MASS INDEX: 24.39 KG/M2 | HEART RATE: 72 BPM | SYSTOLIC BLOOD PRESSURE: 132 MMHG | OXYGEN SATURATION: 97 %

## 2018-07-24 LAB
APTT PPP: 111.6 SEC (ref 23.2–35.3)
APTT PPP: 76.4 SEC (ref 23.2–35.3)
FACTOR V LEIDEN MUTATION, XMF5LT: NORMAL %
HCT VFR BLD AUTO: 34.2 % (ref 41.1–50.3)
HGB BLD-MCNC: 11.1 G/DL (ref 13.6–17.2)
INR PPP: 1.9
LUPUS ANTICOAG PANEL, XMLUPT: NORMAL
PROTHROMBIN G2021A MUTATION, XMPTMT: NORMAL
PROTHROMBIN TIME: 21 SEC (ref 11.5–14.5)

## 2018-07-24 PROCEDURE — 74011250637 HC RX REV CODE- 250/637: Performed by: INTERNAL MEDICINE

## 2018-07-24 PROCEDURE — 77030020263 HC SOL INJ SOD CL0.9% LFCR 1000ML

## 2018-07-24 PROCEDURE — 74011250636 HC RX REV CODE- 250/636: Performed by: RADIOLOGY

## 2018-07-24 PROCEDURE — 85730 THROMBOPLASTIN TIME PARTIAL: CPT | Performed by: INTERNAL MEDICINE

## 2018-07-24 PROCEDURE — 85730 THROMBOPLASTIN TIME PARTIAL: CPT | Performed by: RADIOLOGY

## 2018-07-24 PROCEDURE — 74011250637 HC RX REV CODE- 250/637: Performed by: RADIOLOGY

## 2018-07-24 PROCEDURE — 85018 HEMOGLOBIN: CPT | Performed by: RADIOLOGY

## 2018-07-24 PROCEDURE — 74011250637 HC RX REV CODE- 250/637: Performed by: NURSE PRACTITIONER

## 2018-07-24 PROCEDURE — 85610 PROTHROMBIN TIME: CPT | Performed by: RADIOLOGY

## 2018-07-24 PROCEDURE — 36415 COLL VENOUS BLD VENIPUNCTURE: CPT | Performed by: INTERNAL MEDICINE

## 2018-07-24 RX ORDER — OXYCODONE AND ACETAMINOPHEN 5; 325 MG/1; MG/1
1 TABLET ORAL
Qty: 25 TAB | Refills: 0 | Status: SHIPPED | OUTPATIENT
Start: 2018-07-24

## 2018-07-24 RX ORDER — WARFARIN 10 MG/1
10 TABLET ORAL DAILY
Qty: 30 TAB | Refills: 0 | Status: SHIPPED | OUTPATIENT
Start: 2018-07-24 | End: 2018-08-15 | Stop reason: SDUPTHER

## 2018-07-24 RX ADMIN — FOLIC ACID 1 MG: 1 TABLET ORAL at 08:38

## 2018-07-24 RX ADMIN — OXYCODONE HYDROCHLORIDE AND ACETAMINOPHEN 1 TABLET: 10; 325 TABLET ORAL at 03:11

## 2018-07-24 RX ADMIN — HEPARIN SODIUM AND DEXTROSE 20 UNITS/KG/HR: 5000; 5 INJECTION INTRAVENOUS at 01:18

## 2018-07-24 RX ADMIN — Medication 10 ML: at 06:02

## 2018-07-24 RX ADMIN — HYDROCORTISONE: 5 CREAM TOPICAL at 08:43

## 2018-07-24 RX ADMIN — Medication 100 MG: at 08:37

## 2018-07-24 RX ADMIN — HEPARIN SODIUM AND DEXTROSE 20 UNITS/KG/HR: 5000; 5 INJECTION INTRAVENOUS at 03:07

## 2018-07-24 RX ADMIN — B-COMPLEX W/ C & FOLIC ACID TAB 1 TABLET: TAB at 08:37

## 2018-07-24 RX ADMIN — SODIUM CHLORIDE 100 ML/HR: 900 INJECTION, SOLUTION INTRAVENOUS at 08:39

## 2018-07-24 RX ADMIN — PANTOPRAZOLE SODIUM 40 MG: 40 TABLET, DELAYED RELEASE ORAL at 06:02

## 2018-07-24 NOTE — DISCHARGE INSTRUCTIONS
DISCHARGE SUMMARY from Nurse    PATIENT INSTRUCTIONS:    After general anesthesia or intravenous sedation, for 24 hours or while taking prescription Narcotics:  · Limit your activities  · Do not drive and operate hazardous machinery  · Do not make important personal or business decisions  · Do  not drink alcoholic beverages  · If you have not urinated within 8 hours after discharge, please contact your surgeon on call. Report the following to your surgeon:  · Excessive pain, swelling, redness or odor of or around the surgical area  · Temperature over 100.5  · Nausea and vomiting lasting longer than 4 hours or if unable to take medications  · Any signs of decreased circulation or nerve impairment to extremity: change in color, persistent  numbness, tingling, coldness or increase pain  · Any questions    What to do at Home:  Recommended activity: Activity as tolerated,     If you experience any of the following symptoms fever, chills, nausea or vomiting, new or unrelieved pain,reoccurence of bleeding or any other worrisome symptoms, please follow up with PCP. *  Please give a list of your current medications to your Primary Care Provider. *  Please update this list whenever your medications are discontinued, doses are      changed, or new medications (including over-the-counter products) are added. *  Please carry medication information at all times in case of emergency situations. These are general instructions for a healthy lifestyle:    No smoking/ No tobacco products/ Avoid exposure to second hand smoke  Surgeon General's Warning:  Quitting smoking now greatly reduces serious risk to your health.     Obesity, smoking, and sedentary lifestyle greatly increases your risk for illness    A healthy diet, regular physical exercise & weight monitoring are important for maintaining a healthy lifestyle    You may be retaining fluid if you have a history of heart failure or if you experience any of the following symptoms:  Weight gain of 3 pounds or more overnight or 5 pounds in a week, increased swelling in our hands or feet or shortness of breath while lying flat in bed. Please call your doctor as soon as you notice any of these symptoms; do not wait until your next office visit. Recognize signs and symptoms of STROKE:    F-face looks uneven    A-arms unable to move or move unevenly    S-speech slurred or non-existent    T-time-call 911 as soon as signs and symptoms begin-DO NOT go       Back to bed or wait to see if you get better-TIME IS BRAIN. Warning Signs of HEART ATTACK     Call 911 if you have these symptoms:   Chest discomfort. Most heart attacks involve discomfort in the center of the chest that lasts more than a few minutes, or that goes away and comes back. It can feel like uncomfortable pressure, squeezing, fullness, or pain.  Discomfort in other areas of the upper body. Symptoms can include pain or discomfort in one or both arms, the back, neck, jaw, or stomach.  Shortness of breath with or without chest discomfort.  Other signs may include breaking out in a cold sweat, nausea, or lightheadedness. Don't wait more than five minutes to call 911 - MINUTES MATTER! Fast action can save your life. Calling 911 is almost always the fastest way to get lifesaving treatment. Emergency Medical Services staff can begin treatment when they arrive -- up to an hour sooner than if someone gets to the hospital by car. The discharge information has been reviewed with the patient. The patient verbalized understanding. Discharge medications reviewed with the patient and appropriate educational materials and side effects teaching were provided.   ___________________________________________________________________________________________________________________________________

## 2018-07-24 NOTE — PROGRESS NOTES
Call placed to [facility] and asked if they can ensure that patient has PT/INR checked at follow up appointment. Script will also be sent with patient to bring to follow up.

## 2018-07-24 NOTE — PROGRESS NOTES
Pt eager to go home. INR 1.9 at the moment. This nurse explained to pt that providers have to evaluate pt and labs prior to discharging pt. Pt seems anxious and states \"I'm going to leave today. \"

## 2018-07-24 NOTE — DISCHARGE SUMMARY
Hospitalist Discharge Summary     Admit Date:  7/15/2018  2:11 PM   Name:  Washington Rowland   Age:  50 y.o.  :  1970   MRN:  [id number]   PCP:  Natalie Tabares MD  Treatment Team: Attending Provider: Kay Garcia MD; Utilization Review: Kari Pa; Consulting Provider: Jennifer Prabhakar MD; Care Manager: Kaylee Moore RN    Problem List for this Hospitalization:  Hospital Problems as of 2018  Date Reviewed: 10/3/2014          Codes Class Noted - Resolved POA    * (Principal)IVC thrombosis ([facility] [address] ICD-10-CM: O36.230  ICD-9-CM: 453.2  2018 - Present Yes        Hemangioma of liver ICD-10-CM: D18.03  ICD-9-CM: 228.04  2018 - Present Yes        Excessive weight loss ICD-10-CM: R63.4  ICD-9-CM: 783.21  2018 - Present Yes        Bradycardia, sinus ICD-10-CM: R00.1  ICD-9-CM: 427.89  2018 - Present Yes        Abdominal pain ICD-10-CM: R10.9  ICD-9-CM: 789.00  7/15/2018 - Present Yes              Hospital Course:  48yo AAM with PMH of Gastric Perforation s/p abdominal surgery , tobacco and Etoh dependence, Liver Hemangiomas, Recent Wt loss presented with abd pain, N+V and 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 Seen by IR and s/p IVC filter and EKOS , transferred to ICU. The pt was transferred from ICU to remote telemetry- waiting on INR to be therapeutic. He cannot afford NOAC. He has some anxiety and will often complain of pain similar to when he came in - His H&H has remained stable. He also likes to have BM's everyday and will request aids. He also complained of a rash to abdomen yesterday that he insisted was new bt wife came and states it comes and goes all the time for years. Looks like eczema and we started a low dose hydrocortisone cream.       · IVC Thrombus: s/p  IR placing an IVC Filter and EKOS, Coumadin 12 mg daily, INR 1.9 today from 1.3 yesterday. Will decrease his Coumadin today to 10 and close follow up with cardiology and INR clinic. · Heme consulted for Thrombosis and suspected Malignancy given Wt loss and suspicious polyp removed recently. Will follow up as an outpatient  · Abdominal pain- resolved at this time - w/up negative suspect dyspepsia as well as some anxiety; hb stable  · Pt INR level ok. for 2-3 d/w ytson (IR)  · On nicotine patch  · Bradycardia: Asymptomatic, EKG is sinus anay, continue to watch on tele  · on IV Folate/thiamine/MVT, DT precautions. · Rash to abdomen - pt states new but wife states it comes and goes for years, - benadryl,  low dose steroid cream        Follow up instructions below. Plan was discussed with patient. All questions answered. Patient was stable at time of discharge and was instructed to call or return if there are any concerns or recurrence of symptoms. Diagnostic Imaging/Tests:   Alexis Llamas Thrombolysis Non Deborah Or Intracranial    Result Date: 7/17/2018  Title: Left iliac venogram, Inferior Vena Cavogram.  Inferior Vena Cava Filter placement. Catheter placement for the initiation of of ultrasound mediated, catheter directed, tPA thrombolysis. History: Very pleasant 59-year-old man with CT scan evidence of a large thrombus in the inferior vena cava. Contrast enhancement is poor, although iliac vein thrombus may also be present. Lower extremity ultrasound demonstrates no DVT. Approximate 30 pound weight loss in the last few months. No previous history of malignancy although he is beginning a workup during this hospitalization. Daily alcohol use.  2, large, hepatic hemangiomas. Consent: Informed written and oral consent was obtained from the patient after explanation of benefits and risks (including, but not limited to:  infection, hemorrhage, leg swelling, filter fracture/migration/penetration) of the procedure. I specifically discussed the potential for increased bleeding risk in relation to the hepatic hemangiomas.   As there is no documented risk in the literature, the patient and I agreed to proceed. His questions were answered to his satisfaction. He stated understanding and requested that we proceed. Procedure:  Maximal sterile barrier technique (including:  cap, mask, sterile gown, sterile gloves, sterile sheet, hand hygiene, chlorhexidene for antiseptic skin preparation, sterile ultrasound techniques including sterile gel and sterile ultrasound probe cover) was used. A local field block with lidocaine was achieved. Ultrasound evaluation of all potential access sites was performed due to lack of a palpable vein. The vein was not palpable due to overlying adipose tissue. Using real-time ultrasound guidance, with appropriate image recording, the patent right internal jugular vein was accessed. Using fluoroscopy, a 2 marker pigtail catheter was positioned into the left internal iliac vein. From this location, venography was performed with imaging of the left pelvis and abdomen. The angiographic catheter was retracted into the left common iliac vein/inferior vena cava junction and venography was repeated. The pigtail catheter was exchanged for the IVC Filter delivery system over an Amplatz wire. Using bony landmarks as a reference, the [facility] select inferior vena cava filter was partially exposed above the thrombus and advanced gently in an attempt to displace the thrombus caudally. Unfortunately, this was unsuccessful as the thrombus would not engage. The filter was retracted and inspected and found to be intact. Therefore, it was readvanced through the delivery system and eventually deployed in a suprarenal position. The Amplatz wire was advanced through the filter struts and into the right common iliac vein. The filter delivery sheath was exchanged for an 11-Central African, 23 cm long, bright tip vascular sheath. A 9-Central African Cordis guide catheter was advanced over the wire. Multiple attempts were made to aspirate the thrombus returning only blood and no clot.   A distal inferior venacavogram was performed demonstrating the thrombus to be unchanged. Therefore, the guide catheter was exchanged over the Amplatz wire for a 6 cm treatment zone EKOS catheter. The sideholes were positioned within the inferior vena cava adjacent to the thrombus. Sterile dressings were applied. Complications: None. Radiation Exposure Indices: Reference Air Kerma (Ka,r) = 586 mGy Dose Area Product/Kerma Area Product (DAP/GISSELLE/PKA) = [id number] cGy-cm2 Fluoroscopy Exposure Time = 4 minutes 36 seconds Fluorographic Images = 4 venograms Contrast:  70 milliliters. Medications:  Under physician supervision, 61 minutes of moderate intravenous conscious sedation was performed by administering Versed, Fentanyl, Benadryl intravenously. Continuous pulse oximetry, heart rate, and blood pressure monitoring was performed with an independent, trained observer present. Findings: Widely patent right internal jugular vein. Patent left external iliac and common iliac veins. The left internal iliac vein is slightly narrowed at its junction with the common iliac vein. The left common iliac vein is flattened, but not stenotic. The right common iliac and internal iliac veins are patent. There is a sausage-like thrombus extending from the right common iliac/inferior vena cava junction along the right side of the inferior vena cava to the level of the renal veins. Reflux confirms right renal vein patency. Unopacified blood inflow confirms left renal vein patency. The suprarenal inferior vena cava is patent. Suprarenal IVC measures maximally 23 mm diameter. The [facility] filter was placed in a suprarenal location in order to secure the legs against the wall without intervening thrombus. At the conclusion of the procedure the filter is tilted approximately 5-10 degrees with the catheter running between the struts into the distal IVC. Impression: Uncomplicated infrarenal Cook Celect Jamul inferior vena cava filter placement.   Initiation of catheter directed, ultrasound mediated, tPA thrombolysis of the IVC. Plan: The patient has been transferred to the intensive care unit for tPA 1 mg per hour, heparin 300 units per hour, bedrest.  Follow-up inferior venacavogram tomorrow. Ct Abd Pelv W Cont    Result Date: 7/15/2018  CT ABDOMEN AND PELVIS WITH IV CONTRAST HISTORY:  Abdominal pain and burning. COMPARISON:  CT abdomen and pelvis October 3, 7057 TECHNIQUE:  Helical scans through the abdomen and pelvis with oral and IV contrast, Isovue-370, 100 cc  IV to improve conspicuity of infection and neoplasia. Radiation dose reduction techniques were used for this study: All CT scans performed at this facility use one or all of the following: Automated exposure control, adjustment of the mA and/or kVp according to patient's size, iterative reconstruction. CT ABDOMEN:  Bowel loops are not dilated. No abnormal extraluminal gas or fluid. Kidneys demonstrate normal enhancement and negative for evidence of urinary obstruction. Large right lobe and smaller left lobe lesions with peripheral noncontiguous enhancement most consistent with hemangiomas, unchanged. No new liver lesions. IVC normal caliber. There is well-defined filling defect in the IVC extending from the renal vein level caudally and into the left common iliac vein. Unfortunately the iliac veins and femoral veins are not opacified, probably related to bolus timing. CT PELVIS:  No free fluid in the pelvis. No mass or lymphadenopathy. IMPRESSION:  1. Normal bowel gas pattern. 2. Large thrombus within the IVC, possibly extending into the iliac veins. 3. No change liver hemangiomas. [facility] The critical results contained in this report were communicated to the ED physician, Dr. Alessandro Boswell by myself, Dr. Owen Cornell on 7/15/2018 at 1650 hours. Critical results were communicated as outlined in Section II.C.2.a.i of the ACR Practice Guideline for Communication of Diagnostic Imaging Findings.      Duplex Lower Ext Venous Bilat    Result Date: 7/16/2018  BILATERAL LOWER EXTREMITY VENOUS DUPLEX ULTRASOUND. HISTORY:  Pain. TECHNIQUE: Direct skin-contact high resolution grayscale images, color-flow Doppler and duplex waveform analysis. FINDINGS: There is compressibility, color-flow assignment and augmentation of the venous waveform with calf compression in the common femoral, superficial femoral and popliteal veins. Upper calf veins are unremarkable. Thrombus is identified in the mid inferior vena cava. IMPRESSION: Negative for sonographic evidence of deep venous thrombosis bilateral lower extremity. Thrombus is identified in the mid inferior vena cava. Echocardiogram results:  No results found for this visit on 07/15/18. All Micro Results     None          Labs: Results:       BMP, Mg, Phos No results for input(s): NA, K, CL, CO2, AGAP, BUN, CREA, CA, GLU, MG, PHOS in the last 72 hours. CBC Recent Labs      07/24/18   0453  07/23/18   0450  07/22/18   0923   HGB  11.1*  11.4*  11.4*   HCT  34.2*  35.2*  34.0*      LFT No results for input(s): SGOT, ALT, TBIL, AP, TP, ALB, GLOB, AGRAT, GPT in the last 72 hours.    Cardiac Testing No results found for: BNPP, BNP, CPK, RCK1, RCK2, RCK3, RCK4, CKMB, CKNDX, CKND1, TROPT, TROIQ   Coagulation Tests Lab Results   Component Value Date/Time    Prothrombin time 21.0 (H) 07/24/2018 04:53 AM    Prothrombin time 15.9 (H) 07/23/2018 04:50 AM    Prothrombin time 16.0 (H) 07/22/2018 05:40 AM    INR 1.9 07/24/2018 04:53 AM    INR 1.3 07/23/2018 04:50 AM    INR 1.3 07/22/2018 05:40 AM    aPTT 111.6 (HH) 07/24/2018 04:53 AM    aPTT 76.4 (H) 07/24/2018 12:05 AM    aPTT 89.4 (H) 07/23/2018 01:13 PM      A1c No results found for: HBA1C, HGBE8, LTT1JZTC   Lipid Panel No results found for: CHOL, CHOLPOCT, CHOLX, CHLST, CHOLV, 472648, HDL, LDL, LDLC, DLDLP, 054563, VLDLC, VLDL, TGLX, TRIGL, TRIGP, TGLPOCT, CHHD, CHHDX   Thyroid Panel No results found for: T4, T3U, TSH, TSHEXT     Most Recent UA No results found for: COLOR, APPRN, REFSG, VANESSA, PROTU, GLUCU, KETU, BILU, BLDU, UROU, GORGE, LEUKU     No Known Allergies    There is no immunization history on file for this patient. All Labs from Last 24 Hrs:  Recent Results (from the past 24 hour(s))   PTT    Collection Time: 07/23/18  1:13 PM   Result Value Ref Range    aPTT 89.4 (H) 23.2 - 35.3 SEC   PTT    Collection Time: 07/24/18 12:05 AM   Result Value Ref Range    aPTT 76.4 (H) 23.2 - 35.3 SEC   PROTHROMBIN TIME + INR    Collection Time: 07/24/18  4:53 AM   Result Value Ref Range    Prothrombin time 21.0 (H) 11.5 - 14.5 sec    INR 1.9     PTT    Collection Time: 07/24/18  4:53 AM   Result Value Ref Range    aPTT 111.6 (HH) 23.2 - 35.3 SEC   HGB & HCT    Collection Time: 07/24/18  4:53 AM   Result Value Ref Range    HGB 11.1 (L) 13.6 - 17.2 g/dL    HCT 34.2 (L) 41.1 - 50.3 %       Discharge Exam:  Patient Vitals for the past 24 hrs:   Temp Pulse Resp BP SpO2   07/24/18 1219 98.4 °F (36.9 °C) 72 14 132/87 97 %   07/24/18 0822 98.4 °F (36.9 °C) 79 18 127/84 96 %   07/24/18 0418 98.2 °F (36.8 °C) 64 16 116/69 100 %   07/23/18 2345 98.2 °F (36.8 °C) 66 18 120/78 100 %   07/23/18 2001 98.3 °F (36.8 °C) 62 18 117/77 100 %   07/23/18 1456 98.1 °F (36.7 °C) 70 18 114/69 97 %     Oxygen Therapy  O2 Sat (%): 97 % (07/24/18 1219)  Pulse via Oximetry: 70 beats per minute (07/18/18 1358)  O2 Device: Room air (07/18/18 0709)  O2 Flow Rate (L/min): 2 l/min (07/17/18 1654)  ETCO2 (mmHg): 40 mmHg (07/17/18 1503)    Intake/Output Summary (Last 24 hours) at 07/24/18 1245  Last data filed at 07/24/18 0912   Gross per 24 hour   Intake              600 ml   Output             2125 ml   Net            -1525 ml       General:                    No acute distress. Alert.    Head:                                   AT/NC  Lungs:                                 CTABL.    Heart:                                  RRR, no murmur, rub, or gallop  Abdomen:                  Soft, non-distended, non-tender, +bs  Extremities:               No cyanosis or clubbing. Neurologic:                No focal deficits. Moves all extremities. Skin:                                    No Obvious Rash  Psych:                                 Normal affect.      Discharge Info:   Current Discharge Medication List      START taking these medications    Details   warfarin (COUMADIN) 10 mg tablet Take 1 Tab by mouth daily. Qty: 30 Tab, Refills: 0      oxyCODONE-acetaminophen (PERCOCET) 5-325 mg per tablet Take 1 Tab by mouth every four (4) hours as needed for Pain. Max Daily Amount: 6 Tabs. Qty: 25 Tab, Refills: 0    Associated Diagnoses: Inferior vena cava thromboembolism, acute ([address]         CONTINUE these medications which have NOT CHANGED    Details   ibuprofen (MOTRIN) 600 mg tablet Take 1 Tab by mouth every six (6) hours as needed for Pain. Qty: 20 Tab, Refills: 0      cyclobenzaprine (FLEXERIL) 10 mg tablet Take 1 Tab by mouth three (3) times daily as needed for Muscle Spasm(s). Qty: 15 Tab, Refills: 0               Disposition: home    Activity: Activity as tolerated  Diet: DIET REGULAR  FOOD [facility] COMMENTS    Follow-up Information     Follow up With Details Comments Contact Info    Nolvia Galvez MD On 7/26/2018 @ 8:20am. Please arrange for INR level lab test prior to visit [facility]      Jason Marie MD On 8/2/2018 at [facility] [address] [facility]. [address]      Yao Friedman MD   [place] [facility] [address]  [address]  [phone]              Time spent in patient discharge planning and coordination 40 minutes. Signed:   Janina Gilliland MD

## 2018-07-24 NOTE — PROGRESS NOTES
Hourly rounds completed and all needs were met at this time. Pt c/o pain, pain medication given per MAR. Pt had one BM this shift. Pt has been encouraged to ambulate with assistance. Will continue to monitor and report to oncoming nurse.

## 2018-07-25 LAB
ANTIBODIES PERFORMED, 502264: NORMAL
CD59 CELLS BLD QL: NORMAL
CD59 DEFICIENT GRANULOCYTES NFR BLD: NORMAL %
CELL POPULATION, PNH12T: NORMAL
COMMENT, 502274: NORMAL
COMMENT, PNH8T: NORMAL
DIRECTOR REVIEW, PNHL: NORMAL
MONOCYTES, 502262: NORMAL
SPECIMEN SOURCE: NORMAL
SUBMITTED DX, PNH10T: NORMAL
VIABLE CELLS NFR SPEC: NORMAL %

## 2018-07-30 LAB
Lab: NORMAL
REFERENCE LAB,REFLB: NORMAL
TEST DESCRIPTION:,ATST: NORMAL

## 2018-09-07 ENCOUNTER — HOSPITAL ENCOUNTER (OUTPATIENT)
Dept: LAB | Age: 48
Discharge: HOME | End: 2018-09-07
Payer: SELF-PAY

## 2018-09-07 DIAGNOSIS — I82.220 IVC THROMBOSIS (HCC): ICD-10-CM

## 2018-09-07 LAB
ALBUMIN SERPL-MCNC: 3.5 G/DL (ref 3.5–5)
ALBUMIN/GLOB SERPL: 0.8 {RATIO} (ref 1.2–3.5)
ALP SERPL-CCNC: 102 U/L (ref 50–136)
ALT SERPL-CCNC: 37 U/L (ref 12–65)
ANION GAP SERPL CALC-SCNC: 9 MMOL/L (ref 7–16)
AST SERPL-CCNC: 32 U/L (ref 15–37)
BASOPHILS # BLD: 0.1 K/UL (ref 0–0.2)
BASOPHILS NFR BLD: 1 % (ref 0–2)
BILIRUB SERPL-MCNC: 0.5 MG/DL (ref 0.2–1.1)
BUN SERPL-MCNC: 16 MG/DL (ref 6–23)
CALCIUM SERPL-MCNC: 8.2 MG/DL (ref 8.3–10.4)
CHLORIDE SERPL-SCNC: 107 MMOL/L (ref 98–107)
CO2 SERPL-SCNC: 27 MMOL/L (ref 21–32)
CREAT SERPL-MCNC: 1.14 MG/DL (ref 0.8–1.5)
D DIMER PPP FEU-MCNC: <0.27 UG/ML(FEU)
DIFFERENTIAL METHOD BLD: ABNORMAL
EOSINOPHIL # BLD: 0.1 K/UL (ref 0–0.8)
EOSINOPHIL NFR BLD: 2 % (ref 0.5–7.8)
ERYTHROCYTE [DISTWIDTH] IN BLOOD BY AUTOMATED COUNT: 12.8 %
GLOBULIN SER CALC-MCNC: 4.2 G/DL (ref 2.3–3.5)
GLUCOSE SERPL-MCNC: 111 MG/DL (ref 65–100)
HCT VFR BLD AUTO: 40 % (ref 41.1–50.3)
HGB BLD-MCNC: 12.7 G/DL (ref 13.6–17.2)
IMM GRANULOCYTES # BLD: 0 K/UL (ref 0–0.5)
IMM GRANULOCYTES NFR BLD AUTO: 0 % (ref 0–5)
LYMPHOCYTES # BLD: 2.6 K/UL (ref 0.5–4.6)
LYMPHOCYTES NFR BLD: 35 % (ref 13–44)
MCH RBC QN AUTO: 32.4 PG (ref 26.1–32.9)
MCHC RBC AUTO-ENTMCNC: 31.8 G/DL (ref 31.4–35)
MCV RBC AUTO: 102 FL (ref 79.6–97.8)
MONOCYTES # BLD: 0.7 K/UL (ref 0.1–1.3)
MONOCYTES NFR BLD: 9 % (ref 4–12)
NEUTS SEG # BLD: 3.9 K/UL (ref 1.7–8.2)
NEUTS SEG NFR BLD: 53 % (ref 43–78)
NRBC # BLD: 0 K/UL (ref 0–0.2)
PLATELET # BLD AUTO: 338 K/UL (ref 150–450)
PMV BLD AUTO: 9.4 FL (ref 9.4–12.3)
POTASSIUM SERPL-SCNC: 3.6 MMOL/L (ref 3.5–5.1)
PROT SERPL-MCNC: 7.7 G/DL (ref 6.3–8.2)
RBC # BLD AUTO: 3.92 M/UL (ref 4.23–5.6)
SODIUM SERPL-SCNC: 143 MMOL/L (ref 136–145)
WBC # BLD AUTO: 7.4 K/UL (ref 4.3–11.1)

## 2018-09-07 PROCEDURE — 85025 COMPLETE CBC W/AUTO DIFF WBC: CPT

## 2018-09-07 PROCEDURE — 85379 FIBRIN DEGRADATION QUANT: CPT

## 2018-09-07 PROCEDURE — 36415 COLL VENOUS BLD VENIPUNCTURE: CPT

## 2018-09-07 PROCEDURE — 80053 COMPREHEN METABOLIC PANEL: CPT

## 2018-09-11 ENCOUNTER — HOSPITAL ENCOUNTER (OUTPATIENT)
Dept: INTERVENTIONAL RADIOLOGY/VASCULAR | Age: 48
Discharge: HOME | End: 2018-09-11
Attending: RADIOLOGY
Payer: SELF-PAY

## 2018-09-11 VITALS
OXYGEN SATURATION: 98 % | HEIGHT: 69 IN | DIASTOLIC BLOOD PRESSURE: 79 MMHG | WEIGHT: 150 LBS | TEMPERATURE: 98.7 F | SYSTOLIC BLOOD PRESSURE: 118 MMHG | BODY MASS INDEX: 22.22 KG/M2 | RESPIRATION RATE: 15 BRPM | HEART RATE: 51 BPM

## 2018-09-11 DIAGNOSIS — I82.409 DVT (DEEP VENOUS THROMBOSIS) (HCC): ICD-10-CM

## 2018-09-11 LAB
INR BLD: 1 (ref 0.9–1.2)
PT BLD: 11.5 SECS (ref 9.6–11.6)

## 2018-09-11 PROCEDURE — 74011250636 HC RX REV CODE- 250/636: Performed by: RADIOLOGY

## 2018-09-11 PROCEDURE — 85610 PROTHROMBIN TIME: CPT

## 2018-09-11 PROCEDURE — 37193 REM ENDOVAS VENA CAVA FILTER: CPT

## 2018-09-11 PROCEDURE — C1894 INTRO/SHEATH, NON-LASER: HCPCS

## 2018-09-11 PROCEDURE — C1769 GUIDE WIRE: HCPCS

## 2018-09-11 PROCEDURE — C1773 RET DEV, INSERTABLE: HCPCS

## 2018-09-11 PROCEDURE — 77030004629 HC CATH ANGI SZ AUR COOK -B

## 2018-09-11 PROCEDURE — 74011000250 HC RX REV CODE- 250: Performed by: RADIOLOGY

## 2018-09-11 PROCEDURE — 74011636320 HC RX REV CODE- 636/320: Performed by: RADIOLOGY

## 2018-09-11 PROCEDURE — 74011250636 HC RX REV CODE- 250/636

## 2018-09-11 RX ORDER — LIDOCAINE HYDROCHLORIDE 10 MG/ML
1-20 INJECTION INFILTRATION; PERINEURAL ONCE
Status: COMPLETED | OUTPATIENT
Start: 2018-09-11 | End: 2018-09-11

## 2018-09-11 RX ORDER — DIPHENHYDRAMINE HYDROCHLORIDE 50 MG/ML
50 INJECTION, SOLUTION INTRAMUSCULAR; INTRAVENOUS ONCE
Status: COMPLETED | OUTPATIENT
Start: 2018-09-11 | End: 2018-09-11

## 2018-09-11 RX ORDER — MIDAZOLAM HYDROCHLORIDE 1 MG/ML
.5-2 INJECTION, SOLUTION INTRAMUSCULAR; INTRAVENOUS
Status: DISCONTINUED | OUTPATIENT
Start: 2018-09-11 | End: 2018-09-15 | Stop reason: HOSPADM

## 2018-09-11 RX ORDER — OXYCODONE AND ACETAMINOPHEN 5; 325 MG/1; MG/1
1 TABLET ORAL
Status: DISCONTINUED | OUTPATIENT
Start: 2018-09-11 | End: 2018-09-15 | Stop reason: HOSPADM

## 2018-09-11 RX ORDER — SODIUM CHLORIDE 9 MG/ML
25 INJECTION, SOLUTION INTRAVENOUS CONTINUOUS
Status: DISCONTINUED | OUTPATIENT
Start: 2018-09-11 | End: 2018-09-15 | Stop reason: HOSPADM

## 2018-09-11 RX ORDER — FENTANYL CITRATE 50 UG/ML
25-50 INJECTION, SOLUTION INTRAMUSCULAR; INTRAVENOUS
Status: DISCONTINUED | OUTPATIENT
Start: 2018-09-11 | End: 2018-09-15 | Stop reason: HOSPADM

## 2018-09-11 RX ORDER — HEPARIN SODIUM 200 [USP'U]/100ML
1000 INJECTION, SOLUTION INTRAVENOUS ONCE
Status: COMPLETED | OUTPATIENT
Start: 2018-09-11 | End: 2018-09-11

## 2018-09-11 RX ORDER — SODIUM CHLORIDE 9 MG/ML
150 INJECTION, SOLUTION INTRAVENOUS CONTINUOUS
Status: ACTIVE | OUTPATIENT
Start: 2018-09-11 | End: 2018-09-12

## 2018-09-11 RX ADMIN — SODIUM CHLORIDE 25 ML/HR: 900 INJECTION, SOLUTION INTRAVENOUS at 15:27

## 2018-09-11 RX ADMIN — SODIUM BICARBONATE 1 ML: 0.2 INJECTION, SOLUTION INTRAVENOUS at 15:45

## 2018-09-11 RX ADMIN — IOPAMIDOL 32 ML: 612 INJECTION, SOLUTION INTRAVENOUS at 15:50

## 2018-09-11 RX ADMIN — FENTANYL CITRATE 25 MCG: 50 INJECTION, SOLUTION INTRAMUSCULAR; INTRAVENOUS at 15:31

## 2018-09-11 RX ADMIN — HEPARIN SODIUM 2000 UNITS: 5000 INJECTION, SOLUTION INTRAVENOUS; SUBCUTANEOUS at 15:37

## 2018-09-11 RX ADMIN — LIDOCAINE HYDROCHLORIDE 8 ML: 10 INJECTION, SOLUTION INFILTRATION; PERINEURAL at 15:45

## 2018-09-11 RX ADMIN — MIDAZOLAM HYDROCHLORIDE 0.5 MG: 1 INJECTION, SOLUTION INTRAMUSCULAR; INTRAVENOUS at 15:31

## 2018-09-11 RX ADMIN — FENTANYL CITRATE 50 MCG: 50 INJECTION, SOLUTION INTRAMUSCULAR; INTRAVENOUS at 15:52

## 2018-09-11 RX ADMIN — MIDAZOLAM HYDROCHLORIDE 0.5 MG: 1 INJECTION, SOLUTION INTRAMUSCULAR; INTRAVENOUS at 15:40

## 2018-09-11 RX ADMIN — MIDAZOLAM HYDROCHLORIDE 1 MG: 1 INJECTION, SOLUTION INTRAMUSCULAR; INTRAVENOUS at 15:52

## 2018-09-11 RX ADMIN — DIPHENHYDRAMINE HYDROCHLORIDE 50 MG: 50 INJECTION, SOLUTION INTRAMUSCULAR; INTRAVENOUS at 15:32

## 2018-09-11 RX ADMIN — FENTANYL CITRATE 25 MCG: 50 INJECTION, SOLUTION INTRAMUSCULAR; INTRAVENOUS at 15:40

## 2018-09-11 NOTE — H&P
Department of Interventional Radiology  [phone]    History and Physical    Patient:  Katie Linton MRN:  [id number]  SSN:  [id number]    YOB: 1970  Age:  50 y.o. Sex:  male      Primary Care Provider:  Love Lowe MD  Referring Physician:  Cruz Soriano MD    Subjective:     Chief Complaint: IVC filter retrieval     History of the Present Illness: The patient is a 50 y.o. male who presents for IVC filter retrieval. History significant for large infra-renal IVC thrombus treated with TPA thrombolysis, thrombectomy. He has been on coumadin and doing well. C/o RLE discomfort, no swelling. NPO. Past Medical History:   Diagnosis Date    Chronic kidney disease      Past Surgical History:   Procedure Laterality Date    HX GI  10/3/14    gastric perforation    HX ORTHOPAEDIC Left     arm        Review of Systems:    Pertinent items are noted in the History of Present Illness. Current Outpatient Prescriptions   Medication Sig    warfarin (COUMADIN) 10 mg tablet Take 1 Tab by mouth daily.  oxyCODONE-acetaminophen (PERCOCET) 5-325 mg per tablet Take 1 Tab by mouth every four (4) hours as needed for Pain. Max Daily Amount: 6 Tabs.  ibuprofen (MOTRIN) 600 mg tablet Take 1 Tab by mouth every six (6) hours as needed for Pain.  cyclobenzaprine (FLEXERIL) 10 mg tablet Take 1 Tab by mouth three (3) times daily as needed for Muscle Spasm(s).      Current Facility-Administered Medications   Medication Dose Route Frequency    lidocaine (XYLOCAINE) 10 mg/mL (1 %) injection 1-20 mL  1-20 mL IntraDERMal ONCE    sodium bicarbonate (4%) (NEUT) injection 1-2 mL  1-2 mL SubCUTAneous ONCE    heparin (PF) 2 units/ml in NS infusion 2,000 Units  1,000 mL Irrigation ONCE    diphenhydrAMINE (BENADRYL) injection 50 mg  50 mg IntraVENous ONCE    midazolam (VERSED) injection 0.5-2 mg  0.5-2 mg IntraVENous Multiple    fentaNYL citrate (PF) injection 25-50 mcg  25-50 mcg IntraVENous Multiple    iopamidol (ISOVUE 300) 61 % contrast injection 100 mL  100 mL IntraCATHeter RAD ONCE        No Known Allergies    Family History   Problem Relation Age of Onset    Cancer Mother      colon    Cancer Father      liver     Social History   Substance Use Topics    Smoking status: Current Every Day Smoker     Packs/day: 0.50    Smokeless tobacco: Current User    Alcohol use Yes        Objective:       Physical Examination:    Vitals:    09/11/18 1257   BP: 118/83   Pulse: (!) 54   Resp: 18   Temp: 98.7 °F (37.1 °C)   SpO2: 99%   Weight: 68 kg (150 lb)   Height: 5' 9\" (1.753 m)     Blood pressure 118/83, pulse (!) 54, temperature 98.7 °F (37.1 °C), resp. rate 18, height 5' 9\" (1.753 m), weight 68 kg (150 lb), SpO2 99 %. HEART: regular rate and rhythm  LUNG: clear to auscultation bilaterally  ABDOMEN: normal findings: soft, non-tender  EXTREMITIES: warm, no edema   Right Lower Extremity:  No change since exam in the office last week. Slight bogginess in the popliteal fossa--likely a Baker's Cyst.  No mass. No superficial vein cord. No focal tenderness. No skin breakdown.       Laboratory:     Lab Results   Component Value Date/Time    Sodium 143 09/07/2018 12:10 PM    Sodium 141 07/20/2018 10:35 AM    Potassium 3.6 09/07/2018 12:10 PM    Potassium 4.1 07/20/2018 10:35 AM    Chloride 107 09/07/2018 12:10 PM    Chloride 108 (H) 07/20/2018 10:35 AM    CO2 27 09/07/2018 12:10 PM    CO2 28 07/20/2018 10:35 AM    Anion gap 9 09/07/2018 12:10 PM    Anion gap 5 (L) 07/20/2018 10:35 AM    Glucose 111 (H) 09/07/2018 12:10 PM    Glucose 93 07/20/2018 10:35 AM    BUN 16 09/07/2018 12:10 PM    BUN 5 (L) 07/20/2018 10:35 AM    Creatinine 1.14 09/07/2018 12:10 PM    Creatinine 0.88 07/20/2018 10:35 AM    GFR est AA >60 09/07/2018 12:10 PM    GFR est AA >60 07/20/2018 10:35 AM    GFR est non-AA >60 09/07/2018 12:10 PM    GFR est non-AA >60 07/20/2018 10:35 AM    Calcium 8.2 (L) 09/07/2018 12:10 PM    Calcium 8.1 (L) 07/20/2018 10:35 AM    Albumin 3.5 09/07/2018 12:10 PM    Albumin 3.7 07/15/2018 02:13 PM    Protein, total 7.7 09/07/2018 12:10 PM    Protein, total 8.0 07/15/2018 02:13 PM    Globulin 4.2 (H) 09/07/2018 12:10 PM    Globulin 4.3 (H) 07/15/2018 02:13 PM    A-G Ratio 0.8 (L) 09/07/2018 12:10 PM    A-G Ratio 0.9 (L) 07/15/2018 02:13 PM    AST (SGOT) 32 09/07/2018 12:10 PM    AST (SGOT) 54 (H) 07/15/2018 02:13 PM    ALT (SGPT) 37 09/07/2018 12:10 PM    ALT (SGPT) 40 07/15/2018 02:13 PM     Lab Results   Component Value Date/Time    WBC 7.4 09/07/2018 12:10 PM    WBC 5.3 07/20/2018 10:34 AM    HGB 12.7 (L) 09/07/2018 12:10 PM    HGB 11.1 (L) 07/24/2018 04:53 AM    HCT 40.0 (L) 09/07/2018 12:10 PM    HCT 34.2 (L) 07/24/2018 04:53 AM    PLATELET 538 43/40/7593 12:10 PM    PLATELET 806 89/28/3506 10:34 AM     Lab Results   Component Value Date/Time    aPTT 111.6 (HH) 07/24/2018 04:53 AM    aPTT 76.4 (H) 07/24/2018 12:05 AM    Prothrombin time 21.0 (H) 07/24/2018 04:53 AM    Prothrombin time 15.9 (H) 07/23/2018 04:50 AM    INR 1.9 07/24/2018 04:53 AM    INR 1.3 07/23/2018 04:50 AM    INR (POC) 1.0 09/11/2018 12:54 PM       Assessment:     IVC thrombus--now well-treated    Plan:     Planned Procedure:  IVC filter retrieval.  Sedation. Risks, benefits, and alternatives reviewed with patient and he agrees to proceed with the procedure.       Signed By: Pedrito Dillon PA-C     September 11, 2018

## 2018-09-11 NOTE — DISCHARGE INSTRUCTIONS
[address]  Department of Interventional Radiology  [facility]  [phone] Office  [phone] Fax       Inferior Vena Cava Filter Removal Discharge Instructions    General Information:   Today you had an inferior vena cava filter removed. This was removed because it was determined that you no longer needed this filter. Home Care Instructions: You can resume your regular diet and medication regimen. Do not drink alcohol, drive, or make any important legal decisions in the next 24 hours. Do not lift anything heavier than a gallon of milk, or do anything strenuous for the next 24 hours. You will notice a dressing on your neck or groin. This was the insertion site for the retrieval of the device. Keep the site covered until the puncture site has totally healed. You make take a shower with the bandage, but do cover it with plastic wrap. Do not immerse yourself in water until the wound has totally healed. After your puncture wound heals, there is no special care that is needed. You may resume your normal level of activity slowly, after about one week of light activity. Call If:   You should call your Physician and/or the Radiology Nurse if you have any bleeding other than a small spot on your bandage. Please call if you have any signs of infection; fever, swelling, or increased pain at the site. Call if you have any questions of how to take care of your wound. Follow-Up Instructions: Please see your ordering doctor as he/she has requested. To Reach Us: If you have any questions about your procedure, please call the Interventional Radiology department at [phone]. After business hours (5pm) and weekends, call the answering service at [phone] and ask for the Radiologist on call to be paged.      Interventional Radiology General Nurse Discharge    After general anesthesia or intravenous sedation, for 24 hours or while taking prescription Narcotics:  · Limit your activities  · Do not drive and operate hazardous machinery  · Do not make important personal or business decisions  · Do  not drink alcoholic beverages  · If you have not urinated within 8 hours after discharge, please contact your surgeon on call. * Please give a list of your current medications to your Primary Care Provider. * Please update this list whenever your medications are discontinued, doses are     changed, or new medications (including over-the-counter products) are added. * Please carry medication information at all times in case of emergency situations. These are general instructions for a healthy lifestyle:    No smoking/ No tobacco products/ Avoid exposure to second hand smoke  Surgeon General's Warning:  Quitting smoking now greatly reduces serious risk to your health. Obesity, smoking, and sedentary lifestyle greatly increases your risk for illness  A healthy diet, regular physical exercise & weight monitoring are important for maintaining a healthy lifestyle    You may be retaining fluid if you have a history of heart failure or if you experience any of the following symptoms:  Weight gain of 3 pounds or more overnight or 5 pounds in a week, increased swelling in our hands or feet or shortness of breath while lying flat in bed. Please call your doctor as soon as you notice any of these symptoms; do not wait until your next office visit. Recognize signs and symptoms of STROKE:  F-face looks uneven    A-arms unable to move or move unevenly    S-speech slurred or non-existent    T-time-call 911 as soon as signs and symptoms begin-DO NOT go       Back to bed or wait to see if you get better-TIME IS BRAIN.       Patient Signature:  Date: 9/11/2018  Discharging Nurse: Shannan Campos RN

## 2018-09-11 NOTE — IP AVS SNAPSHOT
[address] [id number] [address] 
[phone] Patient: Lisa Sams MRN: [id number] BJY:9/11/1932 About your hospitalization You were admitted on:  September 11, 2018 You last received care in the:  [facility] You were discharged on:  September 11, 2018 Why you were hospitalized Your primary diagnosis was:  Not on File Follow-up Information None Your Scheduled Appointments Thursday November 01, 2018  1:45 PM EDT  
LAB with [address]  
[address] [facility] OUTREACH INSURANCE [address]) Kevin Carbajal [address] [place] [address]  
[phone] Thursday November 01, 2018  2:15 PM EDT Follow Up with MD Olga Roberst Hematology and Oncology [facility] ABDOUL/ Johan Torres [address] [address]  
[phone] Discharge Orders None A check yas indicates which time of day the medication should be taken. My Medications ASK your doctor about these medications Instructions Each Dose to Equal  
 Morning Noon Evening Bedtime  
 cyclobenzaprine 10 mg tablet Commonly known as:  FLEXERIL Your last dose was: Your next dose is: Take 1 Tab by mouth three (3) times daily as needed for Muscle Spasm(s). 10 mg  
    
   
   
   
  
 ibuprofen 600 mg tablet Commonly known as:  MOTRIN Your last dose was: Your next dose is: Take 1 Tab by mouth every six (6) hours as needed for Pain. 600 mg  
    
   
   
   
  
 oxyCODONE-acetaminophen 5-325 mg per tablet Commonly known as:  PERCOCET Your last dose was: Your next dose is: Take 1 Tab by mouth every four (4) hours as needed for Pain. Max Daily Amount: 6 Tabs. 1 Tab  
    
   
   
   
  
 warfarin 10 mg tablet Commonly known as:  COUMADIN Your last dose was: Your next dose is: Take 1 Tab by mouth daily. 10 mg Opioid Education Prescription Opioids: What You Need to Know: 
 
Prescription opioids can be used to help relieve moderate-to-severe pain and are often prescribed following a surgery or injury, or for certain health conditions. These medications can be an important part of treatment but also come with serious risks. Opioids are strong pain medicines. Examples include hydrocodone, oxycodone, fentanyl, and morphine. Heroin is an example of an illegal opioid. It is important to work with your health care provider to make sure you are getting the safest, most effective care. WHAT ARE THE RISKS AND SIDE EFFECTS OF OPIOID USE? Prescription opioids carry serious risks of addiction and overdose, especially with prolonged use. An opioid overdose, often marked by slow breathing, can cause sudden death. The use of prescription opioids can have a number of side effects as well, even when taken as directed. · Tolerance-meaning you might need to take more of a medication for the same pain relief · Physical dependence-meaning you have symptoms of withdrawal when the medication is stopped. Withdrawal symptoms can include nausea, sweating, chills, diarrhea, stomach cramps, and muscle aches. Withdrawal can last up to several weeks, depending on which drug you took and how long you took it. · Increased sensitivity to pain · Constipation · Nausea, vomiting, and dry mouth · Sleepiness and dizziness · Confusion · Depression · Low levels of testosterone that can result in lower sex drive, energy, and strength · Itching and sweating RISKS ARE GREATER WITH:      
· History of drug misuse, substance use disorder, or overdose · Mental health conditions (such as depression or anxiety) · Sleep apnea · Older age (72 years or older) · Pregnancy Avoid alcohol while taking prescription opioids.   Also, unless specifically advised by your health care provider, medications to avoid include: · Benzodiazepines (such as Xanax or Valium) · Muscle relaxants (such as Soma or Flexeril) · Hypnotics (such as Ambien or Lunesta) · Other prescription opioids KNOW YOUR OPTIONS Talk to your health care provider about ways to manage your pain that don't involve prescription opioids. Some of these options may actually work better and have fewer risks and side effects. Options may include: 
· Pain relievers such as acetaminophen, ibuprofen, and naproxen · Some medications that are also used for depression or seizures · Physical therapy and exercise · Counseling to help patients learn how to cope better with triggers of pain and stress. · Application of heat or cold compress · Massage therapy · Relaxation techniques Be Informed Make sure you know the name of your medication, how much and how often to take it, and its potential risks & side effects. IF YOU ARE PRESCRIBED OPIOIDS FOR PAIN: 
· Never take opioids in greater amounts or more often than prescribed. Remember the goal is not to be pain-free but to manage your pain at a tolerable level. · Follow up with your primary care provider to: · Work together to create a plan on how to manage your pain. · Talk about ways to help manage your pain that don't involve prescription opioids. · Talk about any and all concerns and side effects. · Help prevent misuse and abuse. · Never sell or share prescription opioids · Help prevent misuse and abuse. · Store prescription opioids in a secure place and out of reach of others (this may include visitors, children, friends, and family). · Safely dispose of unused/unwanted prescription opioids: Find your community drug take-back program or your pharmacy mail-back program, or flush them down the toilet, following guidance from the Food and Drug Administration (www.fda.gov/Drugs/ResourcesForYou). · Visit www.cdc.gov/drugoverdose to learn about the risks of opioid abuse and overdose. · If you believe you may be struggling with addiction, tell your health care provider and ask for guidance or call Pippa Hewitt at 5-302-017-HLRF. Discharge Instructions [address] Department of Interventional Radiology [facility] 
[phone] Office 
[phone] Fax Inferior Vena Cava Filter Removal Discharge Instructions General Information: Today you had an inferior vena cava filter removed. This was removed because it was determined that you no longer needed this filter. Home Care Instructions: You can resume your regular diet and medication regimen. Do not drink alcohol, drive, or make any important legal decisions in the next 24 hours. Do not lift anything heavier than a gallon of milk, or do anything strenuous for the next 24 hours. You will notice a dressing on your neck or groin. This was the insertion site for the retrieval of the device. Keep the site covered until the puncture site has totally healed. You make take a shower with the bandage, but do cover it with plastic wrap. Do not immerse yourself in water until the wound has totally healed. After your puncture wound heals, there is no special care that is needed. You may resume your normal level of activity slowly, after about one week of light activity. Call If: 
 You should call your Physician and/or the Radiology Nurse if you have any bleeding other than a small spot on your bandage. Please call if you have any signs of infection; fever, swelling, or increased pain at the site. Call if you have any questions of how to take care of your wound. Follow-Up Instructions: Please see your ordering doctor as he/she has requested. To Reach Us: If you have any questions about your procedure, please call the Interventional Radiology department at [phone]. After business hours (5pm) and weekends, call the answering service at [phone] and ask for the Radiologist on call to be paged. Interventional Radiology General Nurse Discharge After general anesthesia or intravenous sedation, for 24 hours or while taking prescription Narcotics: · Limit your activities · Do not drive and operate hazardous machinery · Do not make important personal or business decisions · Do  not drink alcoholic beverages · If you have not urinated within 8 hours after discharge, please contact your surgeon on call. * Please give a list of your current medications to your Primary Care Provider. * Please update this list whenever your medications are discontinued, doses are 
   changed, or new medications (including over-the-counter products) are added. * Please carry medication information at all times in case of emergency situations. These are general instructions for a healthy lifestyle: No smoking/ No tobacco products/ Avoid exposure to second hand smoke Surgeon General's Warning:  Quitting smoking now greatly reduces serious risk to your health. Obesity, smoking, and sedentary lifestyle greatly increases your risk for illness A healthy diet, regular physical exercise & weight monitoring are important for maintaining a healthy lifestyle You may be retaining fluid if you have a history of heart failure or if you experience any of the following symptoms:  Weight gain of 3 pounds or more overnight or 5 pounds in a week, increased swelling in our hands or feet or shortness of breath while lying flat in bed. Please call your doctor as soon as you notice any of these symptoms; do not wait until your next office visit. Recognize signs and symptoms of STROKE: 
F-face looks uneven A-arms unable to move or move unevenly S-speech slurred or non-existent T-time-call 911 as soon as signs and symptoms begin-DO NOT go Back to bed or wait to see if you get better-TIME IS BRAIN. Patient Signature: 
Date: 9/11/2018 Discharging Nurse: Allen Sorenson RN Introducing [facility]! [facility] introduces TheraCoat patient portal. Now you can access parts of your medical record, email your doctor's office, and request medication refills online. 1. In your internet browser, go to [email or web address] 2. Click on the First Time User? Click Here link in the Sign In box. You will see the New Member Sign Up page. 3. Enter your TheraCoat Access Code exactly as it appears below. You will not need to use this code after youve completed the sign-up process. If you do not sign up before the expiration date, you must request a new code. · TheraCoat Access Code: [email or web address] Expires: 10/13/2018  2:07 PM 
 
4. Enter the last four digits of your Social Security Number (xxxx) and Date of Birth (mm/dd/yyyy) as indicated and click Submit. You will be taken to the next sign-up page. 5. Create a TheraCoat ID. This will be your TheraCoat login ID and cannot be changed, so think of one that is secure and easy to remember. 6. Create a TheraCoat password. You can change your password at any time. 7. Enter your Password Reset Question and Answer. This can be used at a later time if you forget your password. 8. Enter your e-mail address. You will receive e-mail notification when new information is available in [address] 9. Click Sign Up. You can now view and download portions of your medical record. 10. Click the Download Summary menu link to download a portable copy of your medical information. If you have questions, please visit the Frequently Asked Questions section of the TheraCoat website. Remember, TheraCoat is NOT to be used for urgent needs. For medical emergencies, dial 911. Now available from your iPhone and Android! Introducing Teo Rodriguez As a [address] [place] [address] patient, I wanted to make you aware of our electronic visit tool called Teo Rodriguez. [facility] 24/7 allows you to connect within minutes with a medical provider 24 hours a day, seven days a week via a mobile device or tablet or logging into a secure website from your computer. You can access Teo Rodriguez from anywhere in the [place]. A virtual visit might be right for you when you have a simple condition and feel like you just dont want to get out of bed, or cant get away from work for an appointment, when your regular [address] [place] [address] provider is not available (evenings, weekends or holidays), or when youre out of town and need minor care. Electronic visits cost only $49 and if the [facility] 24/7 provider determines a prescription is needed to treat your condition, one can be electronically transmitted to a nearby pharmacy*. Please take a moment to enroll today if you have not already done so. The enrollment process is free and takes just a few minutes. To enroll, please download the [employer] 24/7 jarrett to your tablet or phone, or visit [email or web address] to enroll on your computer. And, as an [address] [place] [address] patient with a [employer] account, the results of your visits will be scanned into your electronic medical record and your primary care provider will be able to view the scanned results. We urge you to continue to see your regular [facility] provider for your ongoing medical care. And while your primary care provider may not be the one available when you seek a Teo Rodriguez virtual visit, the peace of mind you get from getting a real diagnosis real time can be priceless. For more information on Teo Rodriguez, view our Frequently Asked Questions (FAQs) at [email or web address]. Sincerely, 
 
René Hitchcock MD 
Chief Medical Officer [facility] Hina Boone *:  certain medications cannot be prescribed via Teo Rodriguez Unresulted Labs-Please follow up with your PCP about these lab tests Order Current Status IR REMOVE IVC FILTER W SI In process Providers Seen During Your Hospitalization Provider Specialty Primary office phone Rosette Lopez MD Radiology [phone] Your Primary Care Physician (PCP) Primary Care Physician Office Phone Office Fax Jeanine Gamma [address] You are allergic to the following No active allergies Recent Documentation Height Weight BMI Smoking Status 1.753 m 68 kg 22.15 kg/m2 Current Every Day Smoker Emergency Contacts Name Discharge Info Relation Home Work Mobile [facility]  Life Partner [7] [phone] Patient Belongings The following personal items are in your possession at time of discharge: 
     Visual Aid: None Please provide this summary of care documentation to your next provider. Signatures-by signing, you are acknowledging that this After Visit Summary has been reviewed with you and you have received a copy. Patient Signature:  ____________________________________________________________ Date:  ____________________________________________________________  
  
Doug Sport Provider Signature:  ____________________________________________________________ Date:  ____________________________________________________________

## 2018-09-11 NOTE — PROGRESS NOTES
TRANSFER - OUT REPORT:    Verbal report given to Karsten Trujillo RN on Lucia Ortiz  being transferred to IR for routine post - op       Report consisted of patients Situation, Background, Assessment and   Recommendations(SBAR). Information from the following report(s) OR Summary and MAR was reviewed with the receiving nurse. Lines:   Peripheral IV 09/11/18 Right Antecubital (Active)   Site Assessment Clean, dry, & intact 9/11/2018 12:58 PM   Phlebitis Assessment 0 9/11/2018 12:58 PM   Infiltration Assessment 0 9/11/2018 12:58 PM   Dressing Status Clean, dry, & intact 9/11/2018 12:58 PM   Dressing Type Transparent 9/11/2018 12:58 PM        Opportunity for questions and clarification was provided.       Patient transported with:   Registered Nurse

## 2018-09-11 NOTE — PROCEDURES
Department of Interventional Radiology  [phone]        Interventional Radiology Brief Procedure Note    Patient: Lucia Ortiz MRN: [id number]  SSN: [id number]    YOB: 1970  Age: 50 y.o. Sex: male      Date of Procedure: 9/11/2018    Pre-Procedure Diagnosis: IVC Filter no longer indicated. Post-Procedure Diagnosis: SAME    Procedure(s): Venogram.  IVCF retrieval.      Brief Description of Procedure: as above    Performed By: Natasha Arellano MD     Assistants: None    Anesthesia:Moderate Sedation    Estimated Blood Loss: Less than 10ml    Specimens: None    Implants: None    Findings: No thrombus on the IVCF. Mild narrowing in the RCIV. Complications: None    Recommendations: 1 hour bedrest.  Elevate HOB. Resume coumadin tonight.       Follow Up: PRN    Signed By: Natasha Arellano MD     September 11, 2018

## 2018-11-01 ENCOUNTER — HOSPITAL ENCOUNTER (OUTPATIENT)
Dept: LAB | Age: 48
Discharge: HOME | End: 2018-11-01
Payer: SELF-PAY

## 2018-11-01 DIAGNOSIS — D68.59 THROMBOPHILIA (HCC): ICD-10-CM

## 2018-11-01 LAB
ALBUMIN SERPL-MCNC: 3.6 G/DL (ref 3.5–5)
ALBUMIN/GLOB SERPL: 0.9 {RATIO} (ref 1.2–3.5)
ALP SERPL-CCNC: 78 U/L (ref 50–136)
ALT SERPL-CCNC: 44 U/L (ref 12–65)
ANION GAP SERPL CALC-SCNC: 7 MMOL/L (ref 7–16)
AST SERPL-CCNC: 40 U/L (ref 15–37)
BASOPHILS # BLD: 0 K/UL (ref 0–0.2)
BASOPHILS NFR BLD: 1 % (ref 0–2)
BILIRUB SERPL-MCNC: 0.9 MG/DL (ref 0.2–1.1)
BUN SERPL-MCNC: 6 MG/DL (ref 6–23)
CALCIUM SERPL-MCNC: 8.7 MG/DL (ref 8.3–10.4)
CHLORIDE SERPL-SCNC: 108 MMOL/L (ref 98–107)
CO2 SERPL-SCNC: 27 MMOL/L (ref 21–32)
CREAT SERPL-MCNC: 1.1 MG/DL (ref 0.8–1.5)
D DIMER PPP FEU-MCNC: 0.52 UG/ML(FEU)
DIFFERENTIAL METHOD BLD: ABNORMAL
EOSINOPHIL # BLD: 0.1 K/UL (ref 0–0.8)
EOSINOPHIL NFR BLD: 2 % (ref 0.5–7.8)
ERYTHROCYTE [DISTWIDTH] IN BLOOD BY AUTOMATED COUNT: 12.6 % (ref 11.9–14.6)
GLOBULIN SER CALC-MCNC: 4.2 G/DL (ref 2.3–3.5)
GLUCOSE SERPL-MCNC: 113 MG/DL (ref 65–100)
HCT VFR BLD AUTO: 43 % (ref 41.1–50.3)
HGB BLD-MCNC: 14.1 G/DL (ref 13.6–17.2)
IMM GRANULOCYTES # BLD: 0 K/UL (ref 0–0.5)
IMM GRANULOCYTES NFR BLD AUTO: 0 % (ref 0–5)
INR PPP: 2
LYMPHOCYTES # BLD: 1.6 K/UL (ref 0.5–4.6)
LYMPHOCYTES NFR BLD: 40 % (ref 13–44)
MCH RBC QN AUTO: 32 PG (ref 26.1–32.9)
MCHC RBC AUTO-ENTMCNC: 32.8 G/DL (ref 31.4–35)
MCV RBC AUTO: 97.5 FL (ref 79.6–97.8)
MONOCYTES # BLD: 0.5 K/UL (ref 0.1–1.3)
MONOCYTES NFR BLD: 13 % (ref 4–12)
NEUTS SEG # BLD: 1.8 K/UL (ref 1.7–8.2)
NEUTS SEG NFR BLD: 44 % (ref 43–78)
NRBC # BLD: 0 K/UL (ref 0–0.2)
PLATELET # BLD AUTO: 254 K/UL (ref 150–450)
PMV BLD AUTO: 9.4 FL (ref 9.4–12.3)
POTASSIUM SERPL-SCNC: 4.2 MMOL/L (ref 3.5–5.1)
PROT SERPL-MCNC: 7.8 G/DL (ref 6.3–8.2)
PROTHROMBIN TIME: 21.7 SEC (ref 11.5–14.5)
RBC # BLD AUTO: 4.41 M/UL (ref 4.23–5.67)
SODIUM SERPL-SCNC: 142 MMOL/L (ref 136–145)
WBC # BLD AUTO: 4.1 K/UL (ref 4.3–11.1)

## 2018-11-01 PROCEDURE — 80053 COMPREHEN METABOLIC PANEL: CPT

## 2018-11-01 PROCEDURE — 36415 COLL VENOUS BLD VENIPUNCTURE: CPT

## 2018-11-01 PROCEDURE — 85610 PROTHROMBIN TIME: CPT

## 2018-11-01 PROCEDURE — 85379 FIBRIN DEGRADATION QUANT: CPT

## 2018-11-01 PROCEDURE — 85025 COMPLETE CBC W/AUTO DIFF WBC: CPT

## 2018-11-15 ENCOUNTER — HOSPITAL ENCOUNTER (OUTPATIENT)
Dept: LAB | Age: 48
Discharge: HOME | End: 2018-11-15
Payer: SELF-PAY

## 2018-11-15 DIAGNOSIS — I82.220 IVC THROMBOSIS (HCC): ICD-10-CM

## 2018-11-15 LAB
INR PPP: 1.1
PROTHROMBIN TIME: 12.6 SEC (ref 11.5–14.5)

## 2018-11-15 PROCEDURE — 85610 PROTHROMBIN TIME: CPT

## 2018-11-15 PROCEDURE — 36416 COLLJ CAPILLARY BLOOD SPEC: CPT

## 2018-11-19 ENCOUNTER — HOSPITAL ENCOUNTER (OUTPATIENT)
Dept: LAB | Age: 48
Discharge: HOME | End: 2018-11-19
Payer: SELF-PAY

## 2018-11-19 DIAGNOSIS — I82.220 IVC THROMBOSIS (HCC): ICD-10-CM

## 2018-11-19 LAB
INR PPP: 1.2
PROTHROMBIN TIME: 14.3 SEC (ref 11.5–14.5)

## 2018-11-19 PROCEDURE — 85610 PROTHROMBIN TIME: CPT

## 2018-11-19 PROCEDURE — 36416 COLLJ CAPILLARY BLOOD SPEC: CPT

## 2018-12-03 ENCOUNTER — HOSPITAL ENCOUNTER (OUTPATIENT)
Dept: LAB | Age: 48
Discharge: HOME | End: 2018-12-03
Payer: SELF-PAY

## 2018-12-03 DIAGNOSIS — I82.220 IVC THROMBOSIS (HCC): ICD-10-CM

## 2018-12-03 LAB
INR PPP: 3.3
PROTHROMBIN TIME: 39.7 SEC (ref 11.5–14.5)

## 2018-12-03 PROCEDURE — 36416 COLLJ CAPILLARY BLOOD SPEC: CPT

## 2018-12-03 PROCEDURE — 85610 PROTHROMBIN TIME: CPT

## 2018-12-03 NOTE — ED NOTES
[facility]  [facility] SPECIAL[facility] - [facility] [address]  [address]  [address] [facility] [address]  Dept: [address] [facility] Urology Office Note - Established    Patient:  Saurabh Mancuso  YOB: 1970  Date: 12/3/2018    The patient is a 50 y.o. female whopresents today for evaluation of the following problems:   Chief Complaint   Patient presents with    Nephrolithiasis     6 wk f/u with KUB (Geri Gilberto)        HPI  Pt has h/o stones. Here for f/u. Had flank pain, but this has resolved. Summary of old records: N/A    Additional History: N/A    Procedures Today: N/A    Urinalysis today:  No results found for this visit on 12/03/18. Imaging Reviewed during this Office Visit: kub small bilateral stones  (results were independently reviewed by physician and radiology report verified)    AUA Symptom Score (12/3/2018):                    @(5505)@          Last BUN and creatinine:  No results found for: BUN  No results found for: CREATININE    Additional Lab/Culture results: none    PAST MEDICAL, FAMILY AND SOCIAL HISTORY UPDATE:  History reviewed. No pertinent past medical history. History reviewed. No pertinent surgical history. Family History   Problem Relation Age of Onset    Other Father         coagulation disorder     No outpatient prescriptions have been marked as taking for the 12/3/18 encounter (Office Visit) with Dennys Jean MD.      (All medications reviewed and updated by provider sincelast office visit or hospitalization)   No known allergies  History   Smoking Status    Never Smoker   Smokeless Tobacco    Never Used      (If patient a smoker, smoking cessation counseling offered)     History   Alcohol Use    Yes       REVIEW OF SYSTEMS:  Review of Systems      Physical Exam:      Vitals:    12/03/18 0825   BP: 138/89   Pulse: 77   Temp: 98.7 °F (37.1 °C)     There is no height or weight on file to calculate BMI.   Patient is a 50 y.o. female in noacute distress The patient was given their discharge instructions and  was given prescriptions. The  patient verbalized understanding and had no additional questions. The patient was alert and was discharged via Ambulatory, without additional complaints at time of discharge.   No apparent distress noted

## 2019-03-27 ENCOUNTER — HOSPITAL ENCOUNTER (OUTPATIENT)
Dept: LAB | Age: 49
Discharge: HOME | End: 2019-03-27
Payer: SELF-PAY

## 2019-03-27 DIAGNOSIS — I82.220 IVC THROMBOSIS (HCC): ICD-10-CM

## 2019-03-27 LAB
ALBUMIN SERPL-MCNC: 3.7 G/DL (ref 3.5–5)
ALBUMIN/GLOB SERPL: 0.9 {RATIO} (ref 1.2–3.5)
ALP SERPL-CCNC: 72 U/L (ref 50–136)
ALT SERPL-CCNC: 44 U/L (ref 12–65)
ANION GAP SERPL CALC-SCNC: 6 MMOL/L (ref 7–16)
AST SERPL-CCNC: 38 U/L (ref 15–37)
BASOPHILS # BLD: 0.1 K/UL (ref 0–0.2)
BASOPHILS NFR BLD: 1 % (ref 0–2)
BILIRUB SERPL-MCNC: 0.7 MG/DL (ref 0.2–1.1)
BUN SERPL-MCNC: 13 MG/DL (ref 6–23)
CALCIUM SERPL-MCNC: 8.6 MG/DL (ref 8.3–10.4)
CHLORIDE SERPL-SCNC: 109 MMOL/L (ref 98–107)
CO2 SERPL-SCNC: 27 MMOL/L (ref 21–32)
CREAT SERPL-MCNC: 0.99 MG/DL (ref 0.8–1.5)
D DIMER PPP FEU-MCNC: 2.22 UG/ML(FEU)
DIFFERENTIAL METHOD BLD: ABNORMAL
EOSINOPHIL # BLD: 0.1 K/UL (ref 0–0.8)
EOSINOPHIL NFR BLD: 2 % (ref 0.5–7.8)
ERYTHROCYTE [DISTWIDTH] IN BLOOD BY AUTOMATED COUNT: 12.7 % (ref 11.9–14.6)
GLOBULIN SER CALC-MCNC: 4.1 G/DL (ref 2.3–3.5)
GLUCOSE SERPL-MCNC: 98 MG/DL (ref 65–100)
HCT VFR BLD AUTO: 41.1 % (ref 41.1–50.3)
HGB BLD-MCNC: 13.5 G/DL (ref 13.6–17.2)
IMM GRANULOCYTES # BLD AUTO: 0 K/UL (ref 0–0.5)
IMM GRANULOCYTES NFR BLD AUTO: 0 % (ref 0–5)
INR PPP: 1.1
LYMPHOCYTES # BLD: 2 K/UL (ref 0.5–4.6)
LYMPHOCYTES NFR BLD: 31 % (ref 13–44)
MCH RBC QN AUTO: 33.1 PG (ref 26.1–32.9)
MCHC RBC AUTO-ENTMCNC: 32.8 G/DL (ref 31.4–35)
MCV RBC AUTO: 100.7 FL (ref 79.6–97.8)
MONOCYTES # BLD: 0.6 K/UL (ref 0.1–1.3)
MONOCYTES NFR BLD: 10 % (ref 4–12)
NEUTS SEG # BLD: 3.6 K/UL (ref 1.7–8.2)
NEUTS SEG NFR BLD: 56 % (ref 43–78)
NRBC # BLD: 0 K/UL (ref 0–0.2)
PLATELET # BLD AUTO: 287 K/UL (ref 150–450)
PMV BLD AUTO: 9.2 FL (ref 9.4–12.3)
POTASSIUM SERPL-SCNC: 3.7 MMOL/L (ref 3.5–5.1)
PROT SERPL-MCNC: 7.8 G/DL (ref 6.3–8.2)
PROTHROMBIN TIME: 13.4 SEC (ref 11.7–14.5)
RBC # BLD AUTO: 4.08 M/UL (ref 4.23–5.67)
SODIUM SERPL-SCNC: 142 MMOL/L (ref 136–145)
WBC # BLD AUTO: 6.4 K/UL (ref 4.3–11.1)

## 2019-03-27 PROCEDURE — 36415 COLL VENOUS BLD VENIPUNCTURE: CPT

## 2019-03-27 PROCEDURE — 85025 COMPLETE CBC W/AUTO DIFF WBC: CPT

## 2019-03-27 PROCEDURE — 85379 FIBRIN DEGRADATION QUANT: CPT

## 2019-03-27 PROCEDURE — 85610 PROTHROMBIN TIME: CPT

## 2019-03-27 PROCEDURE — 80053 COMPREHEN METABOLIC PANEL: CPT

## 2019-03-28 ENCOUNTER — HOSPITAL ENCOUNTER (EMERGENCY)
Age: 49
Discharge: HOME | End: 2019-03-28
Attending: EMERGENCY MEDICINE
Payer: SELF-PAY

## 2019-03-28 ENCOUNTER — HOSPITAL ENCOUNTER (OUTPATIENT)
Dept: CT IMAGING | Age: 49
Discharge: HOME | End: 2019-03-28
Attending: NURSE PRACTITIONER
Payer: SELF-PAY

## 2019-03-28 ENCOUNTER — HOSPITAL ENCOUNTER (OUTPATIENT)
Dept: ULTRASOUND IMAGING | Age: 49
Discharge: HOME | End: 2019-03-28
Attending: NURSE PRACTITIONER
Payer: SELF-PAY

## 2019-03-28 VITALS
OXYGEN SATURATION: 98 % | SYSTOLIC BLOOD PRESSURE: 120 MMHG | DIASTOLIC BLOOD PRESSURE: 83 MMHG | BODY MASS INDEX: 22.66 KG/M2 | HEIGHT: 69 IN | TEMPERATURE: 98.2 F | RESPIRATION RATE: 18 BRPM | HEART RATE: 67 BPM | WEIGHT: 153 LBS

## 2019-03-28 DIAGNOSIS — I82.4Y1 ACUTE DEEP VEIN THROMBOSIS (DVT) OF PROXIMAL VEIN OF RIGHT LOWER EXTREMITY (HCC): Primary | ICD-10-CM

## 2019-03-28 DIAGNOSIS — R53.1 RIGHT SIDED WEAKNESS: ICD-10-CM

## 2019-03-28 DIAGNOSIS — M79.89 LEG SWELLING: ICD-10-CM

## 2019-03-28 DIAGNOSIS — D68.59 THROMBOPHILIA (HCC): ICD-10-CM

## 2019-03-28 LAB
ALBUMIN SERPL-MCNC: 3.6 G/DL (ref 3.5–5)
ALBUMIN/GLOB SERPL: 0.9 {RATIO} (ref 1.2–3.5)
ALP SERPL-CCNC: 87 U/L (ref 50–136)
ALT SERPL-CCNC: 41 U/L (ref 12–65)
ANION GAP SERPL CALC-SCNC: 5 MMOL/L (ref 7–16)
AST SERPL-CCNC: 28 U/L (ref 15–37)
BILIRUB SERPL-MCNC: 0.6 MG/DL (ref 0.2–1.1)
BUN SERPL-MCNC: 18 MG/DL (ref 6–23)
CALCIUM SERPL-MCNC: 8.6 MG/DL (ref 8.3–10.4)
CHLORIDE SERPL-SCNC: 111 MMOL/L (ref 98–107)
CO2 SERPL-SCNC: 27 MMOL/L (ref 21–32)
CREAT SERPL-MCNC: 0.98 MG/DL (ref 0.8–1.5)
ERYTHROCYTE [DISTWIDTH] IN BLOOD BY AUTOMATED COUNT: 12.6 % (ref 11.9–14.6)
GLOBULIN SER CALC-MCNC: 3.9 G/DL (ref 2.3–3.5)
GLUCOSE SERPL-MCNC: 75 MG/DL (ref 65–100)
HCT VFR BLD AUTO: 40.2 % (ref 41.1–50.3)
HGB BLD-MCNC: 13.1 G/DL (ref 13.6–17.2)
INR PPP: 0.9
MCH RBC QN AUTO: 33 PG (ref 26.1–32.9)
MCHC RBC AUTO-ENTMCNC: 32.6 G/DL (ref 31.4–35)
MCV RBC AUTO: 101.3 FL (ref 79.6–97.8)
NRBC # BLD: 0 K/UL (ref 0–0.2)
PLATELET # BLD AUTO: 274 K/UL (ref 150–450)
PMV BLD AUTO: 9.4 FL (ref 9.4–12.3)
POTASSIUM SERPL-SCNC: 4 MMOL/L (ref 3.5–5.1)
PROT SERPL-MCNC: 7.5 G/DL (ref 6.3–8.2)
PROTHROMBIN TIME: 12.2 SEC (ref 11.7–14.5)
RBC # BLD AUTO: 3.97 M/UL (ref 4.23–5.6)
SODIUM SERPL-SCNC: 143 MMOL/L (ref 136–145)
WBC # BLD AUTO: 7.5 K/UL (ref 4.3–11.1)

## 2019-03-28 PROCEDURE — 85610 PROTHROMBIN TIME: CPT

## 2019-03-28 PROCEDURE — 93005 ELECTROCARDIOGRAM TRACING: CPT | Performed by: EMERGENCY MEDICINE

## 2019-03-28 PROCEDURE — 80053 COMPREHEN METABOLIC PANEL: CPT

## 2019-03-28 PROCEDURE — 99284 EMERGENCY DEPT VISIT MOD MDM: CPT | Performed by: EMERGENCY MEDICINE

## 2019-03-28 PROCEDURE — 85027 COMPLETE CBC AUTOMATED: CPT

## 2019-03-28 PROCEDURE — 74011250637 HC RX REV CODE- 250/637: Performed by: EMERGENCY MEDICINE

## 2019-03-28 PROCEDURE — 70450 CT HEAD/BRAIN W/O DYE: CPT

## 2019-03-28 PROCEDURE — 93970 EXTREMITY STUDY: CPT

## 2019-03-28 RX ADMIN — APIXABAN 10 MG: 5 TABLET, FILM COATED ORAL at 18:24

## 2019-03-28 NOTE — ED TRIAGE NOTES
Patient reports blood clot right groin area. Patient has history of blood clots. Patient has ultrasound today and was told to come to the ER. Patient ambulatory to triage with slight gait disturbance.

## 2019-03-28 NOTE — ED PROVIDER NOTES
[address] Emergency Department TRIAGE Provider NOTE:  55-year-old male presents to the ER with complaints of DVT in his right leg. Patient has a history of prior blood clots reports that he's been off Coumadin for couple weeks. Was seen at his hematologist's office today. They wrote him a new prescription for Coumadin, but he has not taken any at this point. He also complains of some right-sided weakness which is chronic over several months Head CT and venous Doppler from today were reviewed Patient is awake, alert and oriented. He has no respiratory distress, mildly tachycardic We'll check lab work and EKG Patient awaiting placement in the main emergency department Sissy Perla MD; 3/28/2019 @4:29 PM============================ Lucas Parrish is a 50 y.o. male seen on 3/28/2019 at 4:29 PM in the [facility] EMERGENCY DEPT  
HPI: 55-year-old male presenting after having a DVT diagnosed as an outpatient. 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 He's been taking as directed and reports he takes 10 mg a day. He had gone some months without getting his INR checked which was finally performed yesterday. It was found to be 1.0 and given that he was having other symptoms such as right-sided tingling and pain in his right leg had an outpatient ultrasound performed. The ultrasound does show a new thrombus in the right leg that is nonocclusive. He denies any chest pain or shortness of breath. He denies any focal weakness. Leg Pain This is a new problem. The current episode started 2 days ago. The problem occurs constantly. The problem has not changed since onset. The pain is present in the right upper leg and right lower leg. The quality of the pain is described as aching. The pain is at a severity of 3/10. The pain is moderate.  Pertinent negatives include no numbness, full range of motion, no stiffness, no tingling, no itching, no back pain and no neck pain. The symptoms are aggravated by movement. He has tried nothing for the symptoms. The treatment provided mild relief. Review of Systems: Review of Systems Musculoskeletal: Positive for gait problem. Negative for back pain, neck pain and stiffness. Skin: Negative for itching. Neurological: Negative for tingling and numbness. All other systems reviewed and are negative. PAST MEDICAL HISTORY: 
Primary Care Doctor: Duffy Snellen, [phone] Past Medical History:  
Diagnosis Date  Chronic kidney disease Past Surgical History:  
Procedure Laterality Date  HX GI  10/3/14  
 gastric perforation  HX ORTHOPAEDIC Left   
 arm Social History Socioeconomic History  Marital status: SINGLE Spouse name: Not on file  Number of children: Not on file  Years of education: Not on file  Highest education level: Not on file Tobacco Use  Smoking status: Current Every Day Smoker Packs/day: 0.50  Smokeless tobacco: Current User Substance and Sexual Activity  Alcohol use: Yes  
 
Cannot display prior to admission medications because the patient has not been admitted in this contact. No Known Allergies Physical Exam:  Nursing documentation reviewed. Vitals:  
 03/28/19 1624 BP: 122/85 Pulse: (!) 106 Resp: 18 Temp: 98.2 °F (36.8 °C) SpO2: 96% Vital signs were reviewed. Physical Exam  
Constitutional: He is oriented to person, place, and time. He appears well-developed and well-nourished. HENT:  
Head: Normocephalic and atraumatic. Eyes: Pupils are equal, round, and reactive to light. Conjunctivae and EOM are normal.  
Neck: Normal range of motion. Neck supple. Cardiovascular: Normal rate, regular rhythm, normal heart sounds and intact distal pulses. Pulmonary/Chest: Effort normal and breath sounds normal.  
Abdominal: Soft.  Bowel sounds are normal.  
 Musculoskeletal: Normal range of motion. He exhibits no deformity. Neurological: He is alert and oriented to person, place, and time. No cranial nerve deficit. Skin: Skin is warm and dry. Psychiatric: He has a normal mood and affect. His behavior is normal.  
Nursing note and vitals reviewed. Medical Decision Making MDM Number of Diagnoses or Management Options Acute deep vein thrombosis (DVT) of proximal vein of right lower extremity [facility]):  
Diagnosis management comments: 55-year-old male presenting for known DVT. Amount and/or Complexity of Data Reviewed Clinical lab tests: ordered and reviewed Tests in the radiology section of CPT®: ordered and reviewed Tests in the medicine section of CPT®: ordered and reviewed Discuss the patient with other providers: yes (Discussed with the hematologist on callrecommended starting the eliquis ) Independent visualization of images, tracings, or specimens: yes Risk of Complications, Morbidity, and/or Mortality Presenting problems: moderate Diagnostic procedures: moderate Management options: moderate General comments: Patient has follow-up with his hematologist on Monday. I will start the patient on the knee were anticoagulant and provide him with a 30 day trial pack. His hematologist can decide after looking through his insurance material whether he can continue the newer anticoagulant whether he needs to go back to Coumadin. I personally reviewed the patient's vital signs, laboratory tests, and/or radiological findings. I discussed these findings with the patient and their significance. I answered all questions and gave the patient clear return precautions. The patient was discharged from the emergency department in stable condition Patient Progress Patient progress: improved Procedures ED Evaluation: 
LABS: No results found for this or any previous visit (from the past 24 hour(s)).   
 
RADIOLOGY:  
 No orders to display  
 
_____________________________________________________________________ 
 
ED Course as of Mar 28 1829 Thu Mar 28, 2019  
3541 Discussed the case with the oncologist on call who does recommend starting the patient charles eliquis and the patient has follow-up with his hematologist on Monday so they can transition him back to Coumadin as needed for look for possibilities for continuing the Eliquis in the future [JS] ED Course User Index [JS] Rachelle Villafuerte MD

## 2019-03-28 NOTE — ED NOTES
Patient having right sided weakness and numbness. States that this has been a problem for 3 months. Dr Geeta Robin in [address].

## 2019-03-28 NOTE — ED NOTES
I have reviewed discharge instructions with the patient and spouse. The patient and spouse verbalized understanding. Patient left ED via Discharge Method: ambulatory to Home with spouse. Opportunity for questions and clarification provided. Patient given 1 scripts. No e-sign To continue your aftercare when you leave the hospital, you may receive an automated call from our care team to check in on how you are doing. This is a free service and part of our promise to provide the best care and service to meet your aftercare needs.  If you have questions, or wish to unsubscribe from this service please call [phone]. Thank you for Choosing our [facility] Emergency Department.

## 2019-05-24 PROBLEM — G56.03 BILATERAL CARPAL TUNNEL SYNDROME: Status: ACTIVE | Noted: 2019-05-24

## 2019-05-24 PROBLEM — M79.2 RADICULAR PAIN OF RIGHT UPPER EXTREMITY: Status: ACTIVE | Noted: 2019-05-24

## 2019-05-24 PROBLEM — M54.10 RADICULAR PAIN OF RIGHT LOWER EXTREMITY: Status: ACTIVE | Noted: 2019-05-24

## 2019-05-24 PROBLEM — G56.21 ULNAR NEUROPATHY AT ELBOW, RIGHT: Status: ACTIVE | Noted: 2019-05-24

## 2019-06-24 ENCOUNTER — HOSPITAL ENCOUNTER (OUTPATIENT)
Dept: MRI IMAGING | Age: 49
Discharge: HOME | End: 2019-06-24
Attending: PSYCHIATRY & NEUROLOGY
Payer: SELF-PAY

## 2019-06-24 DIAGNOSIS — M79.2 RADICULAR PAIN OF RIGHT UPPER EXTREMITY: ICD-10-CM

## 2019-06-24 DIAGNOSIS — R29.898 WEAKNESS OF BOTH ARMS: ICD-10-CM

## 2019-06-24 DIAGNOSIS — M54.10 RADICULAR PAIN OF RIGHT LOWER EXTREMITY: ICD-10-CM

## 2019-06-24 PROCEDURE — 72148 MRI LUMBAR SPINE W/O DYE: CPT

## 2019-06-24 PROCEDURE — 72141 MRI NECK SPINE W/O DYE: CPT

## 2019-06-25 NOTE — PROGRESS NOTES
Please mail cervical spine and lumbar spine MRI reports to patient. [email or web address]  Called and discussed cervical spine and lumbar spine MRI results in detail with the patient level by level, explained to patient. Questions were answered. No surgical indication. Gabapentin worked. Suggest for him to be evaluated for the right shoulder pain by orthopedic service. Patient request reports to be mailed to him.

## 2020-03-16 ENCOUNTER — APPOINTMENT (OUTPATIENT)
Dept: GENERAL RADIOLOGY | Age: 50
End: 2020-03-16
Attending: EMERGENCY MEDICINE
Payer: SELF-PAY

## 2020-03-16 ENCOUNTER — HOSPITAL ENCOUNTER (EMERGENCY)
Age: 50
Discharge: HOME | End: 2020-03-16
Attending: EMERGENCY MEDICINE
Payer: SELF-PAY

## 2020-03-16 VITALS
DIASTOLIC BLOOD PRESSURE: 61 MMHG | HEIGHT: 69 IN | BODY MASS INDEX: 25.18 KG/M2 | SYSTOLIC BLOOD PRESSURE: 114 MMHG | OXYGEN SATURATION: 94 % | WEIGHT: 170 LBS | RESPIRATION RATE: 18 BRPM | TEMPERATURE: 98.2 F | HEART RATE: 74 BPM

## 2020-03-16 DIAGNOSIS — K52.9 GASTROENTERITIS, ACUTE: Primary | ICD-10-CM

## 2020-03-16 LAB
ALBUMIN SERPL-MCNC: 3.3 G/DL (ref 3.5–5)
ALBUMIN/GLOB SERPL: 0.7 {RATIO} (ref 1.2–3.5)
ALP SERPL-CCNC: 82 U/L (ref 50–136)
ALT SERPL-CCNC: 79 U/L (ref 12–65)
ANION GAP SERPL CALC-SCNC: 8 MMOL/L (ref 7–16)
AST SERPL-CCNC: 44 U/L (ref 15–37)
BASOPHILS # BLD: 0 K/UL (ref 0–0.2)
BASOPHILS NFR BLD: 0 % (ref 0–2)
BILIRUB SERPL-MCNC: 0.6 MG/DL (ref 0.2–1.1)
BUN SERPL-MCNC: 11 MG/DL (ref 6–23)
CALCIUM SERPL-MCNC: 8.1 MG/DL (ref 8.3–10.4)
CHLORIDE SERPL-SCNC: 105 MMOL/L (ref 98–107)
CO2 SERPL-SCNC: 23 MMOL/L (ref 21–32)
CREAT SERPL-MCNC: 0.88 MG/DL (ref 0.8–1.5)
DIFFERENTIAL METHOD BLD: ABNORMAL
EOSINOPHIL # BLD: 0 K/UL (ref 0–0.8)
EOSINOPHIL NFR BLD: 1 % (ref 0.5–7.8)
ERYTHROCYTE [DISTWIDTH] IN BLOOD BY AUTOMATED COUNT: 11.9 % (ref 11.9–14.6)
FLUAV AG NPH QL IA: NEGATIVE
FLUBV AG NPH QL IA: NEGATIVE
GLOBULIN SER CALC-MCNC: 4.6 G/DL (ref 2.3–3.5)
GLUCOSE SERPL-MCNC: 85 MG/DL (ref 65–100)
HCT VFR BLD AUTO: 39.9 % (ref 41.1–50.3)
HGB BLD-MCNC: 13.3 G/DL (ref 13.6–17.2)
IMM GRANULOCYTES # BLD AUTO: 0 K/UL (ref 0–0.5)
IMM GRANULOCYTES NFR BLD AUTO: 0 % (ref 0–5)
LYMPHOCYTES # BLD: 2.4 K/UL (ref 0.5–4.6)
LYMPHOCYTES NFR BLD: 48 % (ref 13–44)
MCH RBC QN AUTO: 32.3 PG (ref 26.1–32.9)
MCHC RBC AUTO-ENTMCNC: 33.3 G/DL (ref 31.4–35)
MCV RBC AUTO: 96.8 FL (ref 79.6–97.8)
MONOCYTES # BLD: 0.7 K/UL (ref 0.1–1.3)
MONOCYTES NFR BLD: 13 % (ref 4–12)
NEUTS SEG # BLD: 1.9 K/UL (ref 1.7–8.2)
NEUTS SEG NFR BLD: 38 % (ref 43–78)
NRBC # BLD: 0.02 K/UL (ref 0–0.2)
PLATELET # BLD AUTO: 380 K/UL (ref 150–450)
PMV BLD AUTO: 9.2 FL (ref 9.4–12.3)
POTASSIUM SERPL-SCNC: 3.7 MMOL/L (ref 3.5–5.1)
PROT SERPL-MCNC: 7.9 G/DL (ref 6.3–8.2)
RBC # BLD AUTO: 4.12 M/UL (ref 4.23–5.6)
SODIUM SERPL-SCNC: 136 MMOL/L (ref 136–145)
SPECIMEN SOURCE: NORMAL
WBC # BLD AUTO: 5.1 K/UL (ref 4.3–11.1)

## 2020-03-16 PROCEDURE — 71046 X-RAY EXAM CHEST 2 VIEWS: CPT

## 2020-03-16 PROCEDURE — 85025 COMPLETE CBC W/AUTO DIFF WBC: CPT

## 2020-03-16 PROCEDURE — 80053 COMPREHEN METABOLIC PANEL: CPT

## 2020-03-16 PROCEDURE — 74011000250 HC RX REV CODE- 250: Performed by: EMERGENCY MEDICINE

## 2020-03-16 PROCEDURE — 99284 EMERGENCY DEPT VISIT MOD MDM: CPT

## 2020-03-16 PROCEDURE — 74011250636 HC RX REV CODE- 250/636: Performed by: EMERGENCY MEDICINE

## 2020-03-16 PROCEDURE — 96361 HYDRATE IV INFUSION ADD-ON: CPT

## 2020-03-16 PROCEDURE — 96374 THER/PROPH/DIAG INJ IV PUSH: CPT

## 2020-03-16 PROCEDURE — 87804 INFLUENZA ASSAY W/OPTIC: CPT

## 2020-03-16 RX ORDER — ONDANSETRON 4 MG/1
4 TABLET, ORALLY DISINTEGRATING ORAL
Qty: 8 TAB | Refills: 2 | Status: SHIPPED | OUTPATIENT
Start: 2020-03-16

## 2020-03-16 RX ORDER — PROMETHAZINE HYDROCHLORIDE 25 MG/1
25 TABLET ORAL
Qty: 15 TAB | Refills: 2 | Status: SHIPPED | OUTPATIENT
Start: 2020-03-16

## 2020-03-16 RX ADMIN — SODIUM CHLORIDE 1000 ML: 900 INJECTION, SOLUTION INTRAVENOUS at 21:31

## 2020-03-16 RX ADMIN — PROMETHAZINE HYDROCHLORIDE 25 MG: 25 INJECTION INTRAMUSCULAR; INTRAVENOUS at 21:30

## 2020-03-17 NOTE — ED NOTES
I have reviewed discharge instructions with the patient. The patient verbalized understanding. Patient left ED via Discharge Method: ambulatory to Home with family  Opportunity for questions and clarification provided. Patient given 2 scripts. To continue your aftercare when you leave the hospital, you may receive an automated call from our care team to check in on how you are doing. This is a free service and part of our promise to provide the best care and service to meet your aftercare needs.  If you have questions, or wish to unsubscribe from this service please call [phone]. Thank you for Choosing our [facility] Emergency Department.

## 2020-03-17 NOTE — ED PROVIDER NOTES
Patient states he has been feeling badly for the past week. He has had generalized body aches with vomiting and diarrhea. He states he has had chills and felt like he had a fever. He took some TheraFlu this evening without any obvious improvement. He states he has been unable to keep anything down by mouth. He denies any known sick contacts, denies similar symptoms in the past.           Past Medical History:   Diagnosis Date    Bilateral carpal tunnel syndrome 5/24/2019    Chronic kidney disease     Radicular pain of right lower extremity 5/24/2019    Radicular pain of right upper extremity 5/24/2019    Ulnar neuropathy at elbow, right 5/24/2019       Past Surgical History:   Procedure Laterality Date    HX GI  10/3/14    gastric perforation    HX ORTHOPAEDIC Left     arm         Family History:   Problem Relation Age of Onset    Cancer Mother         colon    Cancer Father         liver       Social History     Socioeconomic History    Marital status: SINGLE     Spouse name: Not on file    Number of children: Not on file    Years of education: Not on file    Highest education level: Not on file   Occupational History    Not on file   Social Needs    Financial resource strain: Not on file    Food insecurity     Worry: Not on file     Inability: Not on file    Transportation needs     Medical: Not on file     Non-medical: Not on file   Tobacco Use    Smoking status: Current Every Day Smoker     Packs/day: 0.50    Smokeless tobacco: Current User   Substance and Sexual Activity    Alcohol use:  Yes    Drug use: Not on file    Sexual activity: Not on file   Lifestyle    Physical activity     Days per week: Not on file     Minutes per session: Not on file    Stress: Not on file   Relationships    Social connections     Talks on phone: Not on file     Gets together: Not on file     Attends Confucianism service: Not on file     Active member of club or organization: Not on file     Attends meetings of clubs or organizations: Not on file     Relationship status: Not on file    Intimate partner violence     Fear of current or ex partner: Not on file     Emotionally abused: Not on file     Physically abused: Not on file     Forced sexual activity: Not on file   Other Topics Concern    Not on file   Social History Narrative    Not on file         ALLERGIES: Patient has no known allergies. Review of Systems   Constitutional: Positive for chills and fever. Respiratory: Positive for cough. Gastrointestinal: Positive for diarrhea, nausea and vomiting. All other systems reviewed and are negative. Vitals:    03/16/20 2019 03/16/20 2107   BP: (!) 138/94 116/84   Pulse: 84 75   Resp: 16    Temp: 98.3 °F (36.8 °C)    SpO2: 96% 92%   Weight: 77.1 kg (170 lb)    Height: 5' 9\" (1.753 m)             Physical Exam  Vitals signs and nursing note reviewed. Constitutional:       Appearance: Normal appearance. He is well-developed. HENT:      Head: Normocephalic and atraumatic. Eyes:      Conjunctiva/sclera: Conjunctivae normal.      Pupils: Pupils are equal, round, and reactive to light. Neck:      Musculoskeletal: Normal range of motion and neck supple. Cardiovascular:      Rate and Rhythm: Normal rate and regular rhythm. Heart sounds: Normal heart sounds. Pulmonary:      Effort: Pulmonary effort is normal.      Breath sounds: Normal breath sounds. Abdominal:      General: Bowel sounds are normal.      Palpations: Abdomen is soft. Musculoskeletal: Normal range of motion. Skin:     General: Skin is warm and dry. Neurological:      Mental Status: He is alert and oriented to person, place, and time. MDM  Number of Diagnoses or Management Options  Gastroenteritis, acute: new and requires workup  Diagnosis management comments: 9:14 PM donald results with patient, need for symptomatic treatment. He will be given IV fluids and Phenergan.        Amount and/or Complexity of Data Reviewed  Clinical lab tests: ordered and reviewed    Risk of Complications, Morbidity, and/or Mortality  Presenting problems: moderate  Diagnostic procedures: moderate  Management options: moderate    Patient Progress  Patient progress: stable         Procedures

## 2020-03-17 NOTE — ED TRIAGE NOTES
Reports generalized body aches, n/v/d, productive cough with white sputum, fever/chills. Onset approx 1 week pta. +smoker. Attempted otc meds.  Did not receive flu shot, denies sick exposure

## 2022-03-18 PROBLEM — R63.4 EXCESSIVE WEIGHT LOSS: Status: ACTIVE | Noted: 2018-07-17

## 2022-03-18 PROBLEM — G56.21 ULNAR NEUROPATHY AT ELBOW, RIGHT: Status: ACTIVE | Noted: 2019-05-24

## 2022-03-19 PROBLEM — G56.03 BILATERAL CARPAL TUNNEL SYNDROME: Status: ACTIVE | Noted: 2019-05-24

## 2022-03-19 PROBLEM — I82.220 IVC THROMBOSIS (HCC): Status: ACTIVE | Noted: 2018-07-17

## 2022-03-19 PROBLEM — D18.03 HEMANGIOMA OF LIVER: Status: ACTIVE | Noted: 2018-07-17

## 2022-03-19 PROBLEM — M54.10 RADICULAR PAIN OF RIGHT LOWER EXTREMITY: Status: ACTIVE | Noted: 2019-05-24

## 2022-03-20 PROBLEM — R00.1 BRADYCARDIA, SINUS: Status: ACTIVE | Noted: 2018-07-17

## 2022-03-20 PROBLEM — R10.9 ABDOMINAL PAIN: Status: ACTIVE | Noted: 2018-07-15

## 2022-03-20 PROBLEM — M79.2 RADICULAR PAIN OF RIGHT UPPER EXTREMITY: Status: ACTIVE | Noted: 2019-05-24

## 2022-09-21 ENCOUNTER — HOSPITAL ENCOUNTER (EMERGENCY)
Age: 52
Discharge: HOME | End: 2022-09-21
Attending: EMERGENCY MEDICINE

## 2022-09-21 VITALS
DIASTOLIC BLOOD PRESSURE: 98 MMHG | BODY MASS INDEX: 29.66 KG/M2 | HEART RATE: 76 BPM | OXYGEN SATURATION: 98 % | SYSTOLIC BLOOD PRESSURE: 138 MMHG | TEMPERATURE: 97.7 F | WEIGHT: 189 LBS | HEIGHT: 67 IN | RESPIRATION RATE: 16 BRPM

## 2022-09-21 DIAGNOSIS — L02.91 ABSCESS: Primary | ICD-10-CM

## 2022-09-21 LAB
ERYTHROCYTE [DISTWIDTH] IN BLOOD BY AUTOMATED COUNT: 12.4 % (ref 11.9–14.6)
HCT VFR BLD AUTO: 40.2 % (ref 41.1–50.3)
HGB BLD-MCNC: 13.1 G/DL (ref 13.6–17.2)
MCH RBC QN AUTO: 32.1 PG (ref 26.1–32.9)
MCHC RBC AUTO-ENTMCNC: 32.6 G/DL (ref 31.4–35)
MCV RBC AUTO: 98.5 FL (ref 79.6–97.8)
NRBC # BLD: 0 K/UL (ref 0–0.2)
PLATELET # BLD AUTO: 391 K/UL (ref 150–450)
PMV BLD AUTO: 9.4 FL (ref 9.4–12.3)
RBC # BLD AUTO: 4.08 M/UL (ref 4.23–5.6)
WBC # BLD AUTO: 8.4 K/UL (ref 4.3–11.1)

## 2022-09-21 PROCEDURE — 99283 EMERGENCY DEPT VISIT LOW MDM: CPT

## 2022-09-21 PROCEDURE — 85027 COMPLETE CBC AUTOMATED: CPT

## 2022-09-21 PROCEDURE — 10060 I&D ABSCESS SIMPLE/SINGLE: CPT

## 2022-09-21 PROCEDURE — 6370000000 HC RX 637 (ALT 250 FOR IP): Performed by: EMERGENCY MEDICINE

## 2022-09-21 RX ORDER — CLINDAMYCIN HYDROCHLORIDE 300 MG/1
300 CAPSULE ORAL 3 TIMES DAILY
Qty: 30 CAPSULE | Refills: 0 | Status: SHIPPED | OUTPATIENT
Start: 2022-09-21 | End: 2022-10-01

## 2022-09-21 RX ORDER — OXYCODONE HYDROCHLORIDE AND ACETAMINOPHEN 5; 325 MG/1; MG/1
1 TABLET ORAL
Status: COMPLETED | OUTPATIENT
Start: 2022-09-21 | End: 2022-09-21

## 2022-09-21 RX ADMIN — OXYCODONE AND ACETAMINOPHEN 1 TABLET: 5; 325 TABLET ORAL at 15:02

## 2022-09-21 ASSESSMENT — PAIN DESCRIPTION - LOCATION
LOCATION: WRIST
LOCATION: ABDOMEN

## 2022-09-21 ASSESSMENT — PAIN SCALES - GENERAL
PAINLEVEL_OUTOF10: 6
PAINLEVEL_OUTOF10: 7

## 2022-09-21 ASSESSMENT — PAIN - FUNCTIONAL ASSESSMENT: PAIN_FUNCTIONAL_ASSESSMENT: 0-10

## 2022-09-21 ASSESSMENT — PAIN DESCRIPTION - DESCRIPTORS: DESCRIPTORS: ACHING

## 2022-09-21 NOTE — ED TRIAGE NOTES
Pt arrives via pov with a cyst to left lower abdomen and bilateral wrists. Reports has a hx of these and normally has them lanced open.

## 2022-09-21 NOTE — ED NOTES
I have reviewed discharge instructions with the patient. The patient verbalized understanding. Patient left ED via Discharge Method: ambulatory to Home with family. Opportunity for questions and clarification provided. Patient given 1 scripts. To continue your aftercare when you leave the hospital, you may receive an automated call from our care team to check in on how you are doing. This is a free service and part of our promise to provide the best care and service to meet your aftercare needs.  If you have questions, or wish to unsubscribe from this service please call [phone]. Thank you for Choosing our [employer] Emergency Department.         Tayler Sher RN  09/21/22 8727

## 2022-09-21 NOTE — ED PROVIDER NOTES
Emergency Department Provider Note                   PCP:                Sarthak Causey MD               Age: 46 y.o. Sex: male     No diagnosis found. DISPOSITION         MDM  Number of Diagnoses or Management Options  Diagnosis management comments: Patient has 3 abscesses that were drained. I am going to put him on oral antibiotics with return precautions to have packing removed and reevaluation. Orders Placed This Encounter   Procedures    CBC    [facility]        Medications given in the Emergency Department:  Medications   oxyCODONE-acetaminophen (PERCOCET) 5-325 MG per tablet 1 tablet (has no administration in time range)       New Prescriptions    No medications on file        Kraig Mendiola is a 46 y.o. male who presents to the Emergency Department with chief complaint of    Chief Complaint   Patient presents with    Cyst      Patient has lesion to his lower abdomen and bilateral wrists. States he has had to have lesions drained before. He denies any fever or chills. No known injury. The wound on his abdomen is the worst its been swelling or and very painful to the touch. He denies any other symptoms those no trauma. No vomiting or diarrhea. All other systems reviewed and are negative unless otherwise stated in the History of Present Illness section.        Past Medical History:   Diagnosis Date    Bilateral carpal tunnel syndrome 5/24/2019    Chronic kidney disease     Radicular pain of right lower extremity 5/24/2019    Radicular pain of right upper extremity 5/24/2019    Ulnar neuropathy at elbow, right 5/24/2019        Past Surgical History:   Procedure Laterality Date    GI  10/3/14    gastric perforation    IR IVC FILTER RETRIEVAL  9/11/2018    IR IVC FILTER RETRIEVAL 9/11/2018 [facility]    ORTHOPEDIC SURGERY Left     arm        Family History   Problem Relation Age of Onset    Cancer Mother         colon    Cancer Father         liver Social History     Socioeconomic History    Marital status:      Spouse name: None    Number of children: None    Years of education: None    Highest education level: None   Tobacco Use    Smoking status: Every Day     Packs/day: 0.50     Types: Cigarettes    Smokeless tobacco: Current   Substance and Sexual Activity    Alcohol use: Yes        Allergies: Patient has no known allergies. Previous Medications    APIXABAN STARTER PACK (ELIQUIS) 5 MG TBPK TABLET    Take 10 mg (two 5 mg tablets) by mouth twice a day for 7 days Followed by 5 mg (one 5 mg tablet) by mouth twice a day    CYCLOBENZAPRINE (FLEXERIL) 10 MG TABLET    Take 10 mg by mouth 3 times daily as needed    GABAPENTIN (NEURONTIN) 100 MG CAPSULE    1 tid, when needed may increase by 1 tab q3 days up to 300 mg tid. ONDANSETRON (ZOFRAN-ODT) 4 MG DISINTEGRATING TABLET    Take 4 mg by mouth every 8 hours as needed    OXYCODONE-ACETAMINOPHEN (PERCOCET) 5-325 MG PER TABLET    Take 1 tablet by mouth every 4 hours as needed. PROMETHAZINE (PHENERGAN) 25 MG TABLET    Take 25 mg by mouth every 6 hours as needed    WARFARIN (COUMADIN) 10 MG TABLET    Take 10 mg by mouth daily        Vitals signs and nursing note reviewed. ED Triage Vitals [09/21/22 1112]   Enc Vitals Group      BP (!) 125/90      Heart Rate 88      Resp 16      Temp 98.1 °F (36.7 °C)      Temp Source Oral      SpO2 96 %      Weight 189 lb (85.7 kg)      Height 5' 7\" (1.702 m)      Head Circumference       Peak Flow       Pain Score       Pain Loc       Pain Edu? Excl. in [address]? Physical Exam  Vitals and nursing note reviewed. Constitutional:       General: He is not in acute distress. Appearance: Normal appearance. He is not ill-appearing, toxic-appearing or diaphoretic. HENT:      Head: Normocephalic and atraumatic. Mouth/Throat:      Mouth: Mucous membranes are moist.   Eyes:      General: No scleral icterus.      Conjunctiva/sclera: Conjunctivae normal.      Pupils: Pupils are equal, round, and reactive to light. Cardiovascular:      Rate and Rhythm: Normal rate and regular rhythm. Pulmonary:      Effort: Pulmonary effort is normal. No respiratory distress. Breath sounds: No stridor. No wheezing, rhonchi or rales. Chest:      Chest wall: No tenderness. Abdominal:      General: Abdomen is flat. There is no distension. Palpations: Abdomen is soft. There is no mass. Tenderness: There is no abdominal tenderness. There is no guarding or rebound. Hernia: No hernia is present. Musculoskeletal:      Cervical back: Normal range of motion and neck supple. Skin:     Capillary Refill: Capillary refill takes less than 2 seconds. Findings: No bruising, erythema, lesion or rash. Comments: 5 x 5 cm area of induration, and swelling to the lower abdominal wall. 2 cm x 1 cm area of fluctuance on the right distal wrist on the volar aspect. 2 cm x 2 cm area of induration on the left distal wrist volar aspect. Neurological:      General: No focal deficit present. Mental Status: He is alert and oriented to person, place, and time. Mental status is at baseline.    Psychiatric:         Mood and Affect: Mood normal.         Behavior: Behavior normal.        Incision/Drainage    Date/Time: 9/21/2022 2:37 PM  Performed by: Jv Gray MD  Authorized by: Jv Gray MD     Consent:     Consent obtained:  Verbal    Consent given by:  Patient  Location:     Type:  Abscess    Size:  5 cm 5 cm    Location:  Trunk    Trunk location:  Abdomen  Pre-procedure details:     Skin preparation:  Povidone-iodine  Sedation:     Sedation type:  None  Anesthesia:     Anesthesia method:  Local infiltration    Local anesthetic:  Lidocaine 1% WITH epi  Procedure type:     Complexity:  Complex  Procedure details:     Incision types:  Single straight    Incision depth:  Subcutaneous    Wound management:  Probed and deloculated    Drainage:  Purulent Drainage amount: Moderate    Wound treatment:  Drain placed    Packing materials:  1/4 in gauze    Amount 1/4\":  4  Post-procedure details:     Procedure completion:  Tolerated well, no immediate complications  Comments:      The 2 smaller abscesses on the wrist were both drained as well. Single stab incision done on both after 1% lidocaine. Results Include:    Recent Results (from the past 24 hour(s))   CBC    Collection Time: 09/21/22 11:20 AM   Result Value Ref Range    WBC 8.4 4.3 - 11.1 K/uL    RBC 4.08 (L) 4.23 - 5.6 M/uL    Hemoglobin 13.1 (L) 13.6 - 17.2 g/dL    Hematocrit 40.2 (L) 41.1 - 50.3 %    MCV 98.5 (H) 79.6 - 97.8 FL    MCH 32.1 26.1 - 32.9 PG    MCHC 32.6 31.4 - 35.0 g/dL    RDW 12.4 11.9 - 14.6 %    Platelets 230 076 - 484 K/uL    MPV 9.4 9.4 - 12.3 FL    nRBC 0.00 0.0 - 0.2 K/uL          No orders to display                           Voice dictation software was used during the making of this note. This software is not perfect and grammatical and other typographical errors may be present. This note has not been completely proofread for errors.       Valdez Arellano MD  09/21/22 9699

## 2023-03-21 NOTE — PROGRESS NOTES
Checked in with tech and was told to give them another 5 patients before rolling with patient. NICHOL Hoang